# Patient Record
Sex: MALE | Race: WHITE | NOT HISPANIC OR LATINO | Employment: OTHER | URBAN - METROPOLITAN AREA
[De-identification: names, ages, dates, MRNs, and addresses within clinical notes are randomized per-mention and may not be internally consistent; named-entity substitution may affect disease eponyms.]

---

## 2023-11-21 ENCOUNTER — TELEPHONE (OUTPATIENT)
Dept: OPHTHALMOLOGY | Facility: MEDICAL CENTER | Age: 68
End: 2023-11-21

## 2023-11-21 NOTE — PROGRESS NOTES
REFERRING PHYSICIAN: Daniel Ramirez MD.     CONSULTING PHYSICIAN: Josh Bearden DO     CHIEF COMPLAINT: Chest pain    HISTORY OF PRESENT ILLNESS: The patient is a 68 y.o. male with history of hypertension, hyperlipidemia, and coronary artery disease. Today he states he has chest pain with exertion for the last month. It is relieved with rest. He denies shortness of breath, dizziness, lower extremity edema or orthopnea. He remains active but has slowed down. He has had vein stripping in the past for varicose veins.     PAST MEDICAL HISTORY:   Active Ambulatory Problems     Diagnosis Date Noted    Primary osteoarthritis of first carpometacarpal joint of right hand 12/13/2016    High cholesterol      Resolved Ambulatory Problems     Diagnosis Date Noted    No Resolved Ambulatory Problems     Past Medical History:   Diagnosis Date    CAD (coronary artery disease)     Fracture     Fracture     Hyperlipidemia     Hypertension        PAST SURGICAL HISTORY:   Past Surgical History:   Procedure Laterality Date    ARTHROPLASTY, [facility] JOINT, USING FLEXOR CARPI RADIALIS TENDON Right 12/13/2016    Procedure: Right thumb carpometacarpal arthroscopy abductor pollicus longus graft;  Surgeon: Florencio Ferrer M.D.;  Location: SURGERY [facility];  Service:     APPENDECTOMY      HAND SURGERY Left     Skin grafts and tendon damage    SHOULDER SURGERY Right     Shoulder Rotator Cuff    VEIN LIGATION STRIPPING BILATERAL          ALLERGIES: No Known Allergies     CURRENT MEDICATIONS:   Current Outpatient Medications:     nitroglycerin (NITROSTAT) 0.4 MG SL Tab, Place 0.4 mg under the tongue., Disp: , Rfl:     atorvastatin (LIPITOR) 20 MG Tab, Take 1 Tablet by mouth every evening., Disp: 60 Tablet, Rfl: 5    ibuprofen (MOTRIN) 800 MG Tab, Take 1 Tab by mouth every 8 hours as needed., Disp: 30 Tab, Rfl: 3    isosorbide mononitrate SR (IMDUR) 30 MG TABLET SR 24 HR, Take 30 mg by mouth every day., Disp: , Rfl:     FAMILY  "HISTORY:   Family History   Problem Relation Age of Onset    Osteoporosis Mother     Dementia Father     Stroke Father         SOCIAL HISTORY:   Social History     Socioeconomic History    Marital status: Single     Spouse name: Not on file    Number of children: Not on file    Years of education: Not on file    Highest education level: Not on file   Occupational History    Not on file   Tobacco Use    Smoking status: Never    Smokeless tobacco: Never   Vaping Use    Vaping Use: Never used   Substance and Sexual Activity    Alcohol use: No    Drug use: Never    Sexual activity: Not Currently   Other Topics Concern    Back Care No    Bike Helmet Yes    Blood Transfusions No    Caffeine Concern No    Exercise Yes    Hobby Hazards No     Service No    Occupational Exposure No    Seat Belt Yes    Self-Exams No    Sleep Concern No    Special Diet No    Stress Concern No    Weight Concern Yes   Social History Narrative    Not on file     Social Determinants of Health     Financial Resource Strain: Not on file   Food Insecurity: Not on file   Transportation Needs: Not on file   Physical Activity: Not on file   Stress: Not on file   Social Connections: Not on file   Intimate Partner Violence: Not on file   Housing Stability: Not on file       REVIEW OF SYSTEMS:  Review of Systems   Constitutional: Negative.    HENT: Negative.     Eyes: Negative.    Respiratory: Negative.     Cardiovascular:  Positive for chest pain.   Gastrointestinal: Negative.    Genitourinary: Negative.    Musculoskeletal: Negative.    Skin: Negative.    Neurological: Negative.    Endo/Heme/Allergies: Negative.    Psychiatric/Behavioral: Negative.       PHYSICAL EXAMINATION:    /64 (BP Location: Left arm, Patient Position: Sitting, BP Cuff Size: Adult)   Pulse 64   Temp 36.5 °C (97.7 °F) (Temporal)   Ht 1.791 m (5' 10.5\")   Wt 88.9 kg (196 lb)   SpO2 95%   BMI 27.73 kg/m²      Physical Exam  Constitutional:       General: He is not in " "acute distress.  HENT:      Head: Normocephalic and atraumatic.      Nose: Nose normal.   Eyes:      Conjunctiva/sclera: Conjunctivae normal.      Pupils: Pupils are equal, round, and reactive to light.   Neck:      Vascular: No JVD.      Trachea: No tracheal deviation.   Cardiovascular:      Rate and Rhythm: Normal rate and regular rhythm.      Heart sounds: Normal heart sounds.   Pulmonary:      Effort: Pulmonary effort is normal. No respiratory distress.      Breath sounds: Normal breath sounds. No stridor.   Abdominal:      General: Bowel sounds are normal. There is no distension.      Palpations: Abdomen is soft.      Tenderness: There is no abdominal tenderness.   Musculoskeletal:         General: No tenderness. Normal range of motion.      Cervical back: Normal range of motion and neck supple.   Skin:     General: Skin is warm and dry.   Neurological:      Mental Status: He is alert and oriented to person, place, and time.      Coordination: Coordination normal.      Gait: Gait is intact.   Psychiatric:         Mood and Affect: Mood and affect normal.         Cognition and Memory: Memory normal.       LABS REVIEWED:  Lab Results   Component Value Date/Time    SODIUM 140 11/20/2023 10:04 AM    SODIUM 140 09/12/2023 02:54 PM    POTASSIUM 4.1 11/20/2023 10:04 AM    POTASSIUM 4.3 09/12/2023 02:54 PM    CHLORIDE 108 11/20/2023 10:04 AM    CHLORIDE 107 09/12/2023 02:54 PM    CO2 26.0 11/20/2023 10:04 AM    CO2 26 09/12/2023 02:54 PM    GLUCOSE 93 11/20/2023 10:04 AM    GLUCOSE 97 09/12/2023 02:54 PM    BUN 16.0 11/20/2023 10:04 AM    BUN 22 (H) 09/12/2023 02:54 PM    CREATININE 0.90 11/20/2023 10:04 AM    CREATININE 0.8 09/12/2023 02:54 PM    BUNCREATRAT 17.8 11/20/2023 10:04 AM      No results found for: \"PROTHROMBTM\", \"INR\"   Lab Results   Component Value Date/Time    WBC 5.40 11/20/2023 10:04 AM    WBC 4.2 (L) 09/12/2023 02:54 PM    RBC 4.61 11/20/2023 10:04 AM    RBC 4.63 (L) 09/12/2023 02:54 PM    HEMOGLOBIN " 15.0 11/20/2023 10:04 AM    HEMOGLOBIN 15.0 09/12/2023 02:54 PM    HEMATOCRIT 44.4 11/20/2023 10:04 AM    HEMATOCRIT 44.6 09/12/2023 02:54 PM    MCV 96.4 (H) 11/20/2023 10:04 AM    MCV 96.3 (H) 09/12/2023 02:54 PM    MCH 32.6 11/20/2023 10:04 AM    MCH 32.4 09/12/2023 02:54 PM    MCHC 33.8 11/20/2023 10:04 AM    MCHC 33.6 09/12/2023 02:54 PM    MPV 8.1 11/20/2023 10:04 AM    MPV 9.5 09/12/2023 02:54 PM    NEUTSPOLYS 60.8 11/20/2023 10:04 AM    LYMPHOCYTES 18.6 11/20/2023 10:04 AM    MONOCYTES 10.1 11/20/2023 10:04 AM    EOSINOPHILS 8.6 11/20/2023 10:04 AM    BASOPHILS 1.9 11/20/2023 10:04 AM        IMAGING REVIEWED AND INTERPRETED:    ECHOCARDIOGRAM 11/20/23 [facility]:  1. The left ventricle is normal in size and systolic function with an ejection fraction of 55-60% by Santa's biplane with no wall motion abnormalities noted. There is grade 1 (impaired relaxation) diastolic function with normal LV filling pressures.  2. The right atrium size is mildly dilated  3. Mild aortic regurgitation  4. Mild mitral regurgitation  5. The ascending aorta measures 4.0 cm.  6. The right ventricle is normal in size and function; RVSP is normal at 33 mmHg with a RAP of 8 mmHg.    CARDIAC [place]HE[place] [facility] [place]  [place] VENTRICLE: LVEDP was 16 mmHg, no significant gradient across the aortic valve on pullback.  LEFT MAIN: Large-caliber vessel bifurcating into an LAD and left circumflex without angiographically significant atherosclerotic disease  LAD: Large-caliber, heavily calcified vessel, the LAD has severe disease and  in the proximal segment after the take off of a diagonal. The LAD and the second diagonal fills via a collateral from the proximal RCA. The first diagonal is a medium sized vessel with 60-70% stenosis proximally.  LCX: Large-caliber vessel giving off 2 large OM branches and terminates in the AV groove. The Ostial and proximal LCX has 2 tandem 85-90% stenosis. The lesion is just before take off of an OM1. The OM2  has mild to moderate disease in the proximal segment.  RCA: The RCA is  in the proximal segment but supplies collateral to the LAD. The distal RCA fills via collaterals from the AV groove LCX.    Final impressions:  - Severe calcified 3 vessel disease involving  of the proximal LAD,  of the proximal RCA and Severe disease of the LCX  - Collaterals to the LAD from the right system and Collaterals to the RCA from the AV groove LCX       IMPRESSION:  Coronary artery disease, stable angina, hypertension, hyperlipidemia      PLAN:  I recommend CABG x 3-4, with EVH ,possible radial or BIMA, possible ascending aortic aneurysm repair. Imaging is ordered to facilitate decision making for conduit choice and the aneurysm.     The procedure, its risks, benefits, potential complications and alternative treatments were discussed with the patient in detail including the risks should he decide not to undergo my recommended treatment. All of his questions were answered to his satisfaction and he is willing to proceed with the operation. The risks include death, stroke, infection: to include a rare bacterial infection related to the use of the heart/lung machine, jasmina-operative myocardial infarction, dysrhythmias, diaphragmatic paralysis, chest wall paresthesia, tracheostomy, kidney or other organ failure, possible return to the operating room for bleeding, bleeding requiring transfusion with its attendant risks including AIDS or hepatitis, dehiscence of surgical incisions, respiratory complications including the need for prolonged ventilator support, Protamine or other drug reaction, peripheral neuropathy, loss of limb, and miscount of surgical items. The operative mortality risk is approximately 1%. The STS mortality risk score is 1% and the morbidity and mortality risk score is 9% for CABG. The scores were discussed with patient. He will require CT Chest to rule out ascending aortic aneurysm prior to surgery.     Thank  you for this very challenging consultation and participation in the patient’s care.  I will keep you apprised of all future developments.      The operation is scheduled for Ded. 18th at 0730 AM at  [facility].         Sincerely,       Josh Bearden DO.

## 2023-11-30 ENCOUNTER — OFFICE VISIT (OUTPATIENT)
Dept: CARDIOTHORACIC SURGERY | Facility: MEDICAL CENTER | Age: 68
End: 2023-11-30
Payer: MEDICARE

## 2023-11-30 VITALS
HEIGHT: 71 IN | HEART RATE: 64 BPM | TEMPERATURE: 97.7 F | WEIGHT: 196 LBS | DIASTOLIC BLOOD PRESSURE: 64 MMHG | OXYGEN SATURATION: 95 % | SYSTOLIC BLOOD PRESSURE: 126 MMHG | BODY MASS INDEX: 27.44 KG/M2

## 2023-11-30 DIAGNOSIS — I25.119 CORONARY ARTERY DISEASE INVOLVING NATIVE CORONARY ARTERY OF NATIVE HEART WITH ANGINA PECTORIS (HCC): ICD-10-CM

## 2023-11-30 DIAGNOSIS — I77.810 ASCENDING AORTA DILATATION (HCC): ICD-10-CM

## 2023-11-30 PROCEDURE — 99205 OFFICE O/P NEW HI 60 MIN: CPT | Performed by: THORACIC SURGERY (CARDIOTHORACIC VASCULAR SURGERY)

## 2023-11-30 PROCEDURE — 3074F SYST BP LT 130 MM HG: CPT | Performed by: THORACIC SURGERY (CARDIOTHORACIC VASCULAR SURGERY)

## 2023-11-30 PROCEDURE — 3078F DIAST BP <80 MM HG: CPT | Performed by: THORACIC SURGERY (CARDIOTHORACIC VASCULAR SURGERY)

## 2023-11-30 ASSESSMENT — FIBROSIS 4 INDEX: FIB4 SCORE: 1.3

## 2023-11-30 ASSESSMENT — ENCOUNTER SYMPTOMS
PSYCHIATRIC NEGATIVE: 1
GASTROINTESTINAL NEGATIVE: 1
EYES NEGATIVE: 1
RESPIRATORY NEGATIVE: 1
MUSCULOSKELETAL NEGATIVE: 1
NEUROLOGICAL NEGATIVE: 1
CONSTITUTIONAL NEGATIVE: 1

## 2023-12-01 ENCOUNTER — HOSPITAL ENCOUNTER (OUTPATIENT)
Dept: RADIOLOGY | Facility: MEDICAL CENTER | Age: 68
End: 2023-12-01
Attending: THORACIC SURGERY (CARDIOTHORACIC VASCULAR SURGERY)
Payer: MEDICARE

## 2023-12-01 DIAGNOSIS — I25.119 CORONARY ARTERY DISEASE INVOLVING NATIVE CORONARY ARTERY OF NATIVE HEART WITH ANGINA PECTORIS (HCC): ICD-10-CM

## 2023-12-01 PROCEDURE — 93880 EXTRACRANIAL BILAT STUDY: CPT | Mod: 26 | Performed by: INTERNAL MEDICINE

## 2023-12-01 PROCEDURE — 93970 EXTREMITY STUDY: CPT

## 2023-12-01 PROCEDURE — 93970 EXTREMITY STUDY: CPT | Mod: 26 | Performed by: INTERNAL MEDICINE

## 2023-12-01 PROCEDURE — 93931 UPPER EXTREMITY STUDY: CPT | Mod: LT

## 2023-12-01 PROCEDURE — 93880 EXTRACRANIAL BILAT STUDY: CPT

## 2023-12-01 PROCEDURE — 93931 UPPER EXTREMITY STUDY: CPT | Mod: 26,LT | Performed by: INTERNAL MEDICINE

## 2023-12-01 NOTE — PROGRESS NOTES
Problem: Prehabilitation    Goal: optimize identified modifiable risk factors prior to cardiac surgery.     Intervention: Screening and interventions for the following  risk factors with educational materials provided if indicated  and patient demonstrates readiness to participate.  Dentition, malnutrition, CAD and dietary cholesterol,  obesity, alcohol and tobacco abuse, illegal drug use, and   social support system for post discharge planning.    Additionally, home exercise regimen initiation or continuation  (if appropriate), and teaching of and participation of  inspiratory muscle training via incentive spirometer (provided).    Review of post-surgical physical limitations, upcoming  appointments & testing, ordered diagnostics, and medication review  to identify and educate on anticoagulant and antihypertensives  that need cessation in the days prior to surgery.    The cardiac surgery prehabilitation sheet, surgery instruction sheet,  MAP, education booklet, vitals logbook, normals after heart surgery,  and cardiac rehab flier was provided for patient to read and review.    Surgical CHG wipes were also provided with instructions on use.    DENTITION:  Routine dental appointment prior to surgery is  not indicated for CABG possible TAA repair procedure.    he was not encouraged to get a dental   cleaning as he  does get regular  dental cleaning and denies current dental   infection or issues that should be  addressed prior to surgery.    INCENTIVE SPIROMETRY:  Discussed importance of incentive spirometry (IS) use,  20 times a day AT MINIMUM but more often if possible to  optimize cardio-pulmonary function prior to surgery.  They were instructed to allow a 5 minute rest in  between uses to achieve max IS volume.  Education with return demonstration performed.   Patient is effective, coaching was not needed.    Prehabilitation IS baseline is 3500.    HOME EXERCISE REGIMEN:  We discussed importance of preventing  deconditioning and  muscle wasting in the time preceding surgery as this will occur  post surgery.    > 50 % left main disease present? NO  EF-- 60%  Active sub lingual nitro use? NOT USING  he is appropriate for initiation  of a home exercise regimen.    he is moderately physically active; current  exercise tolerance/level is moderate due to some  symptoms of arm pain and schest pressure with moderate activity.    he was educated to start an   exercise regimen of slow walking on   a flat surface 30 minutes a day, adjusting the  length for tolerance level.     he was educated  that if chest pain or SOB occurs patient is to stop  immediately and if symptoms do not  resolve after stopping to call 911.    he was also encouraged to practice sit to stand  from a chair with one arm to assist or no arm assistance  12 times a day to strengthen quadriceps and improve balance.    CAD:  Patient does have known CAD; education on  cholesterol, diet, and the heart are indicated.    OBESITY:  Patient's BMI is not over 30.  Education regarding portion sizing and diet are not indicated and were not provided.    MALNUTRITION:  Malnutrition screening tool (MST) shows he is not at risk  with a score of 0. (0-1 not at risk; greater or equal to 2 is at risk).    Given MST score, functional capacity,   and no reported muscle loss, it was not  recommended they increase their protein  intake to 1.2 to 2.5 gram/kg/day    SMOKING:  Patient denies current smoking history.  Smoking risks  and cessation education not indicated and was not provided.    ALCOHOL ABUSE:  Patient denies alcohol abuse.  Alcohol risks and cessation  education not indicated and was not provided.    ILLEGAL DRUG USE:  Patient denies illicit drug use.  Illicit drug use risks  and cessation education not indicated and was not provided    SOCIAL SUPPORT SYSTEM FOR DISCHARGE NEEDS:    Patient does have family,  his brother and sister Bri to stay for 1-week post discharge  to assist with ADL's. No barriers to hospital discharge anticipated.    PSYCHOLOGICAL PREPARATION:  We discussed the basics of physical limitation post op to include:               No driving for 4 weeks               No lifting, pushing or pulling > 10 lbs for 6 weeks  Sternal precautions to include moving within the tube and  safe mobility in and out of bed and the chair.    ANTIBIOTIC STEWARDSHIP:  MRSA swab will be collected by [facility] during pre-admit testing.    MEDICATION AN UPCOMING APPOINTMENTS REVIEW:  Current medications were reviewed that may need  specific stop dates prior to surgery. The patient was instructed   to stop the following medications after the indicated date.    No meds to stop    Vascular scheduling sheet was provided and explained.    Walk test Times: 4 4 4    A phone appointment for pre op education was made  for 12/14 at lunch to review all provided education materials.

## 2023-12-04 ENCOUNTER — TELEPHONE (OUTPATIENT)
Dept: CARDIOTHORACIC SURGERY | Facility: MEDICAL CENTER | Age: 68
End: 2023-12-04

## 2023-12-04 ENCOUNTER — APPOINTMENT (OUTPATIENT)
Dept: ADMISSIONS | Facility: MEDICAL CENTER | Age: 68
DRG: 236 | End: 2023-12-04
Attending: THORACIC SURGERY (CARDIOTHORACIC VASCULAR SURGERY)
Payer: MEDICARE

## 2023-12-04 ENCOUNTER — HOSPITAL ENCOUNTER (OUTPATIENT)
Dept: RADIOLOGY | Facility: MEDICAL CENTER | Age: 68
End: 2023-12-04
Attending: THORACIC SURGERY (CARDIOTHORACIC VASCULAR SURGERY)
Payer: MEDICARE

## 2023-12-04 DIAGNOSIS — I25.119 CORONARY ARTERY DISEASE INVOLVING NATIVE CORONARY ARTERY OF NATIVE HEART WITH ANGINA PECTORIS (HCC): ICD-10-CM

## 2023-12-04 PROCEDURE — 71275 CT ANGIOGRAPHY CHEST: CPT

## 2023-12-04 PROCEDURE — 700117 HCHG RX CONTRAST REV CODE 255: Performed by: THORACIC SURGERY (CARDIOTHORACIC VASCULAR SURGERY)

## 2023-12-04 RX ADMIN — IOHEXOL 100 ML: 350 INJECTION, SOLUTION INTRAVENOUS at 15:19

## 2023-12-04 NOTE — TELEPHONE ENCOUNTER
The number to the [facility] was provided, he was also told the surgical plan is CABG X 3-4 vessels and that the labs have to be at [facility] on Friday as surgery is Monday and they are not open on the weekends.    Call time 3 minutes

## 2023-12-12 ENCOUNTER — PRE-ADMISSION TESTING (OUTPATIENT)
Dept: ADMISSIONS | Facility: MEDICAL CENTER | Age: 68
DRG: 236 | End: 2023-12-12
Attending: THORACIC SURGERY (CARDIOTHORACIC VASCULAR SURGERY)
Payer: MEDICARE

## 2023-12-12 RX ORDER — ASPIRIN 81 MG/1
81 TABLET ORAL DAILY
COMMUNITY

## 2023-12-14 ENCOUNTER — TELEPHONE (OUTPATIENT)
Dept: CARDIOTHORACIC SURGERY | Facility: MEDICAL CENTER | Age: 68
End: 2023-12-14
Payer: MEDICARE

## 2023-12-14 NOTE — TELEPHONE ENCOUNTER
Patient wishes me to call him for review in a few hours as he is out at this time.  Will call back later today.

## 2023-12-15 ENCOUNTER — PRE-ADMISSION TESTING (OUTPATIENT)
Dept: ADMISSIONS | Facility: MEDICAL CENTER | Age: 68
DRG: 236 | End: 2023-12-15
Attending: THORACIC SURGERY (CARDIOTHORACIC VASCULAR SURGERY)
Payer: MEDICARE

## 2023-12-15 ENCOUNTER — HOSPITAL ENCOUNTER (OUTPATIENT)
Dept: RADIOLOGY | Facility: MEDICAL CENTER | Age: 68
DRG: 236 | End: 2023-12-15
Attending: THORACIC SURGERY (CARDIOTHORACIC VASCULAR SURGERY) | Admitting: THORACIC SURGERY (CARDIOTHORACIC VASCULAR SURGERY)
Payer: MEDICARE

## 2023-12-15 ENCOUNTER — TELEPHONE (OUTPATIENT)
Dept: CARDIOTHORACIC SURGERY | Facility: MEDICAL CENTER | Age: 68
End: 2023-12-15
Payer: MEDICARE

## 2023-12-15 DIAGNOSIS — Z01.811 PRE-OPERATIVE RESPIRATORY EXAMINATION: ICD-10-CM

## 2023-12-15 DIAGNOSIS — Z01.810 PRE-OPERATIVE CARDIOVASCULAR EXAMINATION: ICD-10-CM

## 2023-12-15 DIAGNOSIS — Z01.812 PRE-OPERATIVE LABORATORY EXAMINATION: ICD-10-CM

## 2023-12-15 LAB
ABO GROUP BLD: NORMAL
ALBUMIN SERPL BCP-MCNC: 4.3 G/DL (ref 3.2–4.9)
ALBUMIN/GLOB SERPL: 1.3 G/DL
ALP SERPL-CCNC: 89 U/L (ref 30–99)
ALT SERPL-CCNC: 23 U/L (ref 2–50)
AMPHET UR QL SCN: NEGATIVE
ANION GAP SERPL CALC-SCNC: 9 MMOL/L (ref 7–16)
APPEARANCE UR: CLEAR
APTT PPP: 31.1 SEC (ref 24.7–36)
AST SERPL-CCNC: 14 U/L (ref 12–45)
BARBITURATES UR QL SCN: NEGATIVE
BASOPHILS # BLD AUTO: 1.9 % (ref 0–1.8)
BASOPHILS # BLD: 0.1 K/UL (ref 0–0.12)
BENZODIAZ UR QL SCN: NEGATIVE
BILIRUB SERPL-MCNC: 0.9 MG/DL (ref 0.1–1.5)
BILIRUB UR QL STRIP.AUTO: NEGATIVE
BLD GP AB SCN SERPL QL: NORMAL
BUN SERPL-MCNC: 14 MG/DL (ref 8–22)
BZE UR QL SCN: NEGATIVE
CALCIUM ALBUM COR SERPL-MCNC: 8.9 MG/DL (ref 8.5–10.5)
CALCIUM SERPL-MCNC: 9.1 MG/DL (ref 8.5–10.5)
CANNABINOIDS UR QL SCN: NEGATIVE
CHLORIDE SERPL-SCNC: 104 MMOL/L (ref 96–112)
CO2 SERPL-SCNC: 27 MMOL/L (ref 20–33)
COLOR UR: YELLOW
CREAT SERPL-MCNC: 0.71 MG/DL (ref 0.5–1.4)
EKG IMPRESSION: NORMAL
EOSINOPHIL # BLD AUTO: 0.38 K/UL (ref 0–0.51)
EOSINOPHIL NFR BLD: 7.4 % (ref 0–6.9)
ERYTHROCYTE [DISTWIDTH] IN BLOOD BY AUTOMATED COUNT: 42.8 FL (ref 35.9–50)
EST. AVERAGE GLUCOSE BLD GHB EST-MCNC: 117 MG/DL
FENTANYL UR QL: NEGATIVE
GFR SERPLBLD CREATININE-BSD FMLA CKD-EPI: 99 ML/MIN/1.73 M 2
GLOBULIN SER CALC-MCNC: 3.2 G/DL (ref 1.9–3.5)
GLUCOSE SERPL-MCNC: 87 MG/DL (ref 65–99)
GLUCOSE UR STRIP.AUTO-MCNC: NEGATIVE MG/DL
HBA1C MFR BLD: 5.7 % (ref 4–5.6)
HCT VFR BLD AUTO: 44.8 % (ref 42–52)
HGB BLD-MCNC: 14.9 G/DL (ref 14–18)
IMM GRANULOCYTES # BLD AUTO: 0.01 K/UL (ref 0–0.11)
IMM GRANULOCYTES NFR BLD AUTO: 0.2 % (ref 0–0.9)
INR PPP: 1.2 (ref 0.87–1.13)
KETONES UR STRIP.AUTO-MCNC: NEGATIVE MG/DL
LEUKOCYTE ESTERASE UR QL STRIP.AUTO: NEGATIVE
LYMPHOCYTES # BLD AUTO: 1.04 K/UL (ref 1–4.8)
LYMPHOCYTES NFR BLD: 20.3 % (ref 22–41)
MCH RBC QN AUTO: 32 PG (ref 27–33)
MCHC RBC AUTO-ENTMCNC: 33.3 G/DL (ref 32.3–36.5)
MCV RBC AUTO: 96.1 FL (ref 81.4–97.8)
METHADONE UR QL SCN: NEGATIVE
MICRO URNS: NORMAL
MONOCYTES # BLD AUTO: 0.51 K/UL (ref 0–0.85)
MONOCYTES NFR BLD AUTO: 9.9 % (ref 0–13.4)
NEUTROPHILS # BLD AUTO: 3.09 K/UL (ref 1.82–7.42)
NEUTROPHILS NFR BLD: 60.3 % (ref 44–72)
NITRITE UR QL STRIP.AUTO: NEGATIVE
NRBC # BLD AUTO: 0 K/UL
NRBC BLD-RTO: 0 /100 WBC (ref 0–0.2)
OPIATES UR QL SCN: NEGATIVE
OXYCODONE UR QL SCN: NEGATIVE
PCP UR QL SCN: NEGATIVE
PH UR STRIP.AUTO: 6.5 [PH] (ref 5–8)
PLATELET # BLD AUTO: 310 K/UL (ref 164–446)
PMV BLD AUTO: 10.4 FL (ref 9–12.9)
POTASSIUM SERPL-SCNC: 4 MMOL/L (ref 3.6–5.5)
PROPOXYPH UR QL SCN: NEGATIVE
PROT SERPL-MCNC: 7.5 G/DL (ref 6–8.2)
PROT UR QL STRIP: NEGATIVE MG/DL
PROTHROMBIN TIME: 15.4 SEC (ref 12–14.6)
RBC # BLD AUTO: 4.66 M/UL (ref 4.7–6.1)
RBC UR QL AUTO: NEGATIVE
RH BLD: NORMAL
SCCMEC + MECA PNL NOSE NAA+PROBE: NEGATIVE
SCCMEC + MECA PNL NOSE NAA+PROBE: NEGATIVE
SODIUM SERPL-SCNC: 140 MMOL/L (ref 135–145)
SP GR UR STRIP.AUTO: 1.01
UROBILINOGEN UR STRIP.AUTO-MCNC: 0.2 MG/DL
WBC # BLD AUTO: 5.1 K/UL (ref 4.8–10.8)

## 2023-12-15 PROCEDURE — 86850 RBC ANTIBODY SCREEN: CPT

## 2023-12-15 PROCEDURE — 87641 MR-STAPH DNA AMP PROBE: CPT

## 2023-12-15 PROCEDURE — 85730 THROMBOPLASTIN TIME PARTIAL: CPT

## 2023-12-15 PROCEDURE — 86900 BLOOD TYPING SEROLOGIC ABO: CPT

## 2023-12-15 PROCEDURE — 93005 ELECTROCARDIOGRAM TRACING: CPT

## 2023-12-15 PROCEDURE — 87640 STAPH A DNA AMP PROBE: CPT

## 2023-12-15 PROCEDURE — 85025 COMPLETE CBC W/AUTO DIFF WBC: CPT

## 2023-12-15 PROCEDURE — 85610 PROTHROMBIN TIME: CPT

## 2023-12-15 PROCEDURE — 83036 HEMOGLOBIN GLYCOSYLATED A1C: CPT

## 2023-12-15 PROCEDURE — 81003 URINALYSIS AUTO W/O SCOPE: CPT

## 2023-12-15 PROCEDURE — 80053 COMPREHEN METABOLIC PANEL: CPT

## 2023-12-15 PROCEDURE — 80307 DRUG TEST PRSMV CHEM ANLYZR: CPT

## 2023-12-15 PROCEDURE — 36415 COLL VENOUS BLD VENIPUNCTURE: CPT

## 2023-12-15 PROCEDURE — 93010 ELECTROCARDIOGRAM REPORT: CPT | Performed by: INTERNAL MEDICINE

## 2023-12-15 PROCEDURE — 86901 BLOOD TYPING SEROLOGIC RH(D): CPT

## 2023-12-15 PROCEDURE — 71046 X-RAY EXAM CHEST 2 VIEWS: CPT

## 2023-12-15 RX ORDER — DEXMEDETOMIDINE HYDROCHLORIDE 4 UG/ML
0-1.5 INJECTION, SOLUTION INTRAVENOUS CONTINUOUS
Status: DISCONTINUED | OUTPATIENT
Start: 2023-12-18 | End: 2023-12-18

## 2023-12-15 RX ORDER — NOREPINEPHRINE BITARTRATE 0.03 MG/ML
0-1 INJECTION, SOLUTION INTRAVENOUS CONTINUOUS
Status: DISCONTINUED | OUTPATIENT
Start: 2023-12-18 | End: 2023-12-18

## 2023-12-15 RX ORDER — EPINEPHRINE HCL IN 0.9 % NACL 4MG/250ML
0-.5 PLASTIC BAG, INJECTION (ML) INTRAVENOUS CONTINUOUS
Status: DISCONTINUED | OUTPATIENT
Start: 2023-12-18 | End: 2023-12-18

## 2023-12-15 ASSESSMENT — FIBROSIS 4 INDEX: FIB4 SCORE: 0.65

## 2023-12-15 NOTE — TELEPHONE ENCOUNTER
Patient was reminded that CABG X 3-4 possible TAA repair surgery with Dr. Bearden is on 12/18 at 0730 in the morning. he   are aware check in is at 0515 am.    Baseline IS was 3500. he has been compliant   with the IS. Volume has improved to 4000.    he was prescribed a walking regimen or  told to continue he current regimen and   he has been compliant.   he has been practicing sit to stand.    The CHG wipes instructions were reviewed and understood.    PAT date and time was reviewed with patient and  verified it is within 72 hours of surgery.    Vascular studies and procedures:  were scheduled, completed,  and reviewed by myself for concerning  results did need escalation to the EMILY/MD.    he did not need a dental check/work.    he had no meds to stop.    he was told that no medication(s) are okay to  take the morning of surgery prior to check in.     he was reminded no food after midnight.    Call time 7 minutes

## 2023-12-18 ENCOUNTER — ANESTHESIA EVENT (OUTPATIENT)
Dept: SURGERY | Facility: MEDICAL CENTER | Age: 68
DRG: 236 | End: 2023-12-18
Payer: MEDICARE

## 2023-12-18 ENCOUNTER — APPOINTMENT (OUTPATIENT)
Dept: RADIOLOGY | Facility: MEDICAL CENTER | Age: 68
DRG: 236 | End: 2023-12-18
Attending: THORACIC SURGERY (CARDIOTHORACIC VASCULAR SURGERY)
Payer: MEDICARE

## 2023-12-18 ENCOUNTER — APPOINTMENT (OUTPATIENT)
Dept: CARDIOLOGY | Facility: MEDICAL CENTER | Age: 68
DRG: 236 | End: 2023-12-18
Attending: THORACIC SURGERY (CARDIOTHORACIC VASCULAR SURGERY)
Payer: MEDICARE

## 2023-12-18 ENCOUNTER — ANESTHESIA (OUTPATIENT)
Dept: SURGERY | Facility: MEDICAL CENTER | Age: 68
DRG: 236 | End: 2023-12-18
Payer: MEDICARE

## 2023-12-18 ENCOUNTER — HOSPITAL ENCOUNTER (INPATIENT)
Facility: MEDICAL CENTER | Age: 68
LOS: 5 days | DRG: 236 | End: 2023-12-23
Attending: THORACIC SURGERY (CARDIOTHORACIC VASCULAR SURGERY) | Admitting: THORACIC SURGERY (CARDIOTHORACIC VASCULAR SURGERY)
Payer: MEDICARE

## 2023-12-18 DIAGNOSIS — I25.810 CORONARY ARTERY DISEASE INVOLVING CORONARY BYPASS GRAFT OF NATIVE HEART, UNSPECIFIED WHETHER ANGINA PRESENT: ICD-10-CM

## 2023-12-18 DIAGNOSIS — I25.10 CORONARY ARTERY DISEASE DUE TO CALCIFIED CORONARY LESION: ICD-10-CM

## 2023-12-18 DIAGNOSIS — Z95.1 S/P CABG X 3: ICD-10-CM

## 2023-12-18 DIAGNOSIS — I25.84 CORONARY ARTERY DISEASE DUE TO CALCIFIED CORONARY LESION: ICD-10-CM

## 2023-12-18 PROBLEM — I10 PRIMARY HYPERTENSION: Status: ACTIVE | Noted: 2023-12-18

## 2023-12-18 PROBLEM — R73.9 HYPERGLYCEMIA: Status: ACTIVE | Noted: 2023-12-18

## 2023-12-18 PROBLEM — Z99.11 ENCOUNTER FOR WEANING FROM VENTILATOR (HCC): Status: ACTIVE | Noted: 2023-12-18

## 2023-12-18 LAB
ABO + RH BLD: NORMAL
ACT BLD: 131 SEC (ref 74–137)
ACT BLD: 152 SEC (ref 74–137)
ACT BLD: 471 SEC (ref 74–137)
ACT BLD: 579 SEC (ref 74–137)
ACT BLD: 612 SEC (ref 74–137)
ACT BLD: 655 SEC (ref 74–137)
APTT PPP: 30.9 SEC (ref 24.7–36)
ARTERIAL PATENCY WRIST A: ABNORMAL
BASE EXCESS BLDA CALC-SCNC: -2 MMOL/L (ref -4–3)
BASE EXCESS BLDA CALC-SCNC: -3 MMOL/L (ref -4–3)
BASE EXCESS BLDA CALC-SCNC: -3 MMOL/L (ref -4–3)
BASE EXCESS BLDA CALC-SCNC: -4 MMOL/L (ref -4–3)
BASE EXCESS BLDA CALC-SCNC: -5 MMOL/L (ref -4–3)
BASE EXCESS BLDA CALC-SCNC: -5 MMOL/L (ref -4–3)
BASE EXCESS BLDA CALC-SCNC: -6 MMOL/L (ref -4–3)
BODY TEMPERATURE: ABNORMAL DEGREES
CA-I BLD ISE-SCNC: 0.96 MMOL/L (ref 1.1–1.3)
CA-I BLD ISE-SCNC: 1 MMOL/L (ref 1.1–1.3)
CA-I BLD ISE-SCNC: 1.01 MMOL/L (ref 1.1–1.3)
CA-I BLD ISE-SCNC: 1.12 MMOL/L (ref 1.1–1.3)
CA-I BLD ISE-SCNC: 1.19 MMOL/L (ref 1.1–1.3)
CA-I BLD ISE-SCNC: 1.22 MMOL/L (ref 1.1–1.3)
CA-I BLD ISE-SCNC: 1.27 MMOL/L (ref 1.1–1.3)
CO2 BLDA-SCNC: 21 MMOL/L (ref 20–33)
CO2 BLDA-SCNC: 22 MMOL/L (ref 20–33)
CO2 BLDA-SCNC: 23 MMOL/L (ref 20–33)
CO2 BLDA-SCNC: 24 MMOL/L (ref 20–33)
CO2 BLDA-SCNC: 25 MMOL/L (ref 20–33)
DELSYS IDSYS: ABNORMAL
EKG IMPRESSION: NORMAL
END TIDAL CARBON DIOXIDE IECO2: 32 MMHG
END TIDAL CARBON DIOXIDE IECO2: 37 MMHG
END TIDAL CARBON DIOXIDE IECO2: 42 MMHG
GLUCOSE BLD STRIP.AUTO-MCNC: 133 MG/DL (ref 65–99)
GLUCOSE BLD STRIP.AUTO-MCNC: 150 MG/DL (ref 65–99)
GLUCOSE BLD STRIP.AUTO-MCNC: 183 MG/DL (ref 65–99)
GLUCOSE BLD STRIP.AUTO-MCNC: 88 MG/DL (ref 65–99)
GLUCOSE BLD STRIP.AUTO-MCNC: 90 MG/DL (ref 65–99)
GLUCOSE BLD STRIP.AUTO-MCNC: 93 MG/DL (ref 65–99)
HCO3 BLDA-SCNC: 20.2 MMOL/L (ref 17–25)
HCO3 BLDA-SCNC: 20.4 MMOL/L (ref 17–25)
HCO3 BLDA-SCNC: 20.7 MMOL/L (ref 17–25)
HCO3 BLDA-SCNC: 21 MMOL/L (ref 17–25)
HCO3 BLDA-SCNC: 21.7 MMOL/L (ref 17–25)
HCO3 BLDA-SCNC: 22 MMOL/L (ref 17–25)
HCO3 BLDA-SCNC: 22.2 MMOL/L (ref 17–25)
HCO3 BLDA-SCNC: 22.8 MMOL/L (ref 17–25)
HCO3 BLDA-SCNC: 23.1 MMOL/L (ref 17–25)
HCT VFR BLD AUTO: 42.3 % (ref 42–52)
HCT VFR BLD CALC: 32 % (ref 42–52)
HCT VFR BLD CALC: 33 % (ref 42–52)
HCT VFR BLD CALC: 33 % (ref 42–52)
HCT VFR BLD CALC: 37 % (ref 42–52)
HCT VFR BLD CALC: 38 % (ref 42–52)
HCT VFR BLD CALC: 41 % (ref 42–52)
HGB BLD-MCNC: 10.9 G/DL (ref 14–18)
HGB BLD-MCNC: 11.2 G/DL (ref 14–18)
HGB BLD-MCNC: 11.2 G/DL (ref 14–18)
HGB BLD-MCNC: 12.6 G/DL (ref 14–18)
HGB BLD-MCNC: 12.9 G/DL (ref 14–18)
HGB BLD-MCNC: 13.9 G/DL (ref 14–18)
HGB BLD-MCNC: 14.1 G/DL (ref 14–18)
HOROWITZ INDEX BLDA+IHG-RTO: 206 MM[HG]
HOROWITZ INDEX BLDA+IHG-RTO: 239 MM[HG]
HOROWITZ INDEX BLDA+IHG-RTO: 246 MM[HG]
INR PPP: 1.33 (ref 0.87–1.13)
LACTATE BLD-SCNC: 1.2 MMOL/L (ref 0.5–2)
LACTATE BLD-SCNC: 1.7 MMOL/L (ref 0.5–2)
MAGNESIUM SERPL-MCNC: 2.9 MG/DL (ref 1.5–2.5)
MODE IMODE: ABNORMAL
O2/TOTAL GAS SETTING VFR VENT: 100 %
O2/TOTAL GAS SETTING VFR VENT: 50 %
O2/TOTAL GAS SETTING VFR VENT: 70 %
PATHOLOGY CONSULT NOTE: NORMAL
PCO2 BLDA: 32 MMHG (ref 26–37)
PCO2 BLDA: 32.2 MMHG (ref 26–37)
PCO2 BLDA: 34.2 MMHG (ref 26–37)
PCO2 BLDA: 36.8 MMHG (ref 26–37)
PCO2 BLDA: 37.1 MMHG (ref 26–37)
PCO2 BLDA: 40.6 MMHG (ref 26–37)
PCO2 BLDA: 42 MMHG (ref 26–37)
PCO2 BLDA: 44.5 MMHG (ref 26–37)
PCO2 BLDA: 47.3 MMHG (ref 26–37)
PCO2 TEMP ADJ BLDA: 29 MMHG (ref 26–37)
PCO2 TEMP ADJ BLDA: 31.2 MMHG (ref 26–37)
PCO2 TEMP ADJ BLDA: 33.2 MMHG (ref 26–37)
PCO2 TEMP ADJ BLDA: 33.3 MMHG (ref 26–37)
PCO2 TEMP ADJ BLDA: 36.3 MMHG (ref 26–37)
PCO2 TEMP ADJ BLDA: 39 MMHG (ref 26–37)
PCO2 TEMP ADJ BLDA: 39.9 MMHG (ref 26–37)
PCO2 TEMP ADJ BLDA: 43.2 MMHG (ref 26–37)
PCO2 TEMP ADJ BLDA: 46.1 MMHG (ref 26–37)
PEEP END EXPIRATORY PRESSURE IPEEP: 8 CMH20
PERCENT MINUTE VOLUME IPMV: 130
PERCENT MINUTE VOLUME IPMV: 160
PERCENT MINUTE VOLUME IPMV: 160
PH BLDA: 7.29 [PH] (ref 7.4–7.5)
PH BLDA: 7.3 [PH] (ref 7.4–7.5)
PH BLDA: 7.32 [PH] (ref 7.4–7.5)
PH BLDA: 7.32 [PH] (ref 7.4–7.5)
PH BLDA: 7.35 [PH] (ref 7.4–7.5)
PH BLDA: 7.39 [PH] (ref 7.4–7.5)
PH BLDA: 7.42 [PH] (ref 7.4–7.5)
PH BLDA: 7.42 [PH] (ref 7.4–7.5)
PH BLDA: 7.44 [PH] (ref 7.4–7.5)
PH TEMP ADJ BLDA: 7.31 [PH] (ref 7.4–7.5)
PH TEMP ADJ BLDA: 7.31 [PH] (ref 7.4–7.5)
PH TEMP ADJ BLDA: 7.33 [PH] (ref 7.4–7.5)
PH TEMP ADJ BLDA: 7.33 [PH] (ref 7.4–7.5)
PH TEMP ADJ BLDA: 7.36 [PH] (ref 7.4–7.5)
PH TEMP ADJ BLDA: 7.42 [PH] (ref 7.4–7.5)
PH TEMP ADJ BLDA: 7.42 [PH] (ref 7.4–7.5)
PH TEMP ADJ BLDA: 7.43 [PH] (ref 7.4–7.5)
PH TEMP ADJ BLDA: 7.47 [PH] (ref 7.4–7.5)
PLATELET # BLD AUTO: 194 K/UL (ref 164–446)
PO2 BLDA: 123 MMHG (ref 64–87)
PO2 BLDA: 144 MMHG (ref 64–87)
PO2 BLDA: 239 MMHG (ref 64–87)
PO2 BLDA: 310 MMHG (ref 64–87)
PO2 BLDA: 310 MMHG (ref 64–87)
PO2 BLDA: 318 MMHG (ref 64–87)
PO2 BLDA: 372 MMHG (ref 64–87)
PO2 BLDA: 384 MMHG (ref 64–87)
PO2 BLDA: >500 MMHG (ref 64–87)
PO2 TEMP ADJ BLDA: 120 MMHG (ref 64–87)
PO2 TEMP ADJ BLDA: 139 MMHG (ref 64–87)
PO2 TEMP ADJ BLDA: 236 MMHG (ref 64–87)
PO2 TEMP ADJ BLDA: 302 MMHG (ref 64–87)
PO2 TEMP ADJ BLDA: 307 MMHG (ref 64–87)
PO2 TEMP ADJ BLDA: 316 MMHG (ref 64–87)
PO2 TEMP ADJ BLDA: 368 MMHG (ref 64–87)
PO2 TEMP ADJ BLDA: 372 MMHG (ref 64–87)
PO2 TEMP ADJ BLDA: 491 MMHG (ref 64–87)
POTASSIUM BLD-SCNC: 3.4 MMOL/L (ref 3.6–5.5)
POTASSIUM BLD-SCNC: 3.8 MMOL/L (ref 3.6–5.5)
POTASSIUM BLD-SCNC: 3.8 MMOL/L (ref 3.6–5.5)
POTASSIUM BLD-SCNC: 4 MMOL/L (ref 3.6–5.5)
POTASSIUM BLD-SCNC: 5 MMOL/L (ref 3.6–5.5)
POTASSIUM BLD-SCNC: 5 MMOL/L (ref 3.6–5.5)
POTASSIUM BLD-SCNC: 5.2 MMOL/L (ref 3.6–5.5)
POTASSIUM SERPL-SCNC: 3.9 MMOL/L (ref 3.6–5.5)
POTASSIUM SERPL-SCNC: 4.3 MMOL/L (ref 3.6–5.5)
PROTHROMBIN TIME: 16.6 SEC (ref 12–14.6)
SAO2 % BLDA: 100 % (ref 93–99)
SAO2 % BLDA: 99 % (ref 93–99)
SAO2 % BLDA: 99 % (ref 93–99)
SODIUM BLD-SCNC: 136 MMOL/L (ref 135–145)
SODIUM BLD-SCNC: 137 MMOL/L (ref 135–145)
SODIUM BLD-SCNC: 137 MMOL/L (ref 135–145)
SODIUM BLD-SCNC: 140 MMOL/L (ref 135–145)
SODIUM BLD-SCNC: 140 MMOL/L (ref 135–145)
SODIUM BLD-SCNC: 141 MMOL/L (ref 135–145)
SODIUM BLD-SCNC: 142 MMOL/L (ref 135–145)
SPECIMEN DRAWN FROM PATIENT: ABNORMAL

## 2023-12-18 PROCEDURE — 06BQ4ZZ EXCISION OF LEFT SAPHENOUS VEIN, PERCUTANEOUS ENDOSCOPIC APPROACH: ICD-10-PCS | Performed by: THORACIC SURGERY (CARDIOTHORACIC VASCULAR SURGERY)

## 2023-12-18 PROCEDURE — 160048 HCHG OR STATISTICAL LEVEL 1-5: Performed by: THORACIC SURGERY (CARDIOTHORACIC VASCULAR SURGERY)

## 2023-12-18 PROCEDURE — 94002 VENT MGMT INPAT INIT DAY: CPT

## 2023-12-18 PROCEDURE — 700111 HCHG RX REV CODE 636 W/ 250 OVERRIDE (IP): Performed by: NURSE PRACTITIONER

## 2023-12-18 PROCEDURE — 85049 AUTOMATED PLATELET COUNT: CPT

## 2023-12-18 PROCEDURE — 82803 BLOOD GASES ANY COMBINATION: CPT | Mod: 91

## 2023-12-18 PROCEDURE — 700111 HCHG RX REV CODE 636 W/ 250 OVERRIDE (IP): Performed by: THORACIC SURGERY (CARDIOTHORACIC VASCULAR SURGERY)

## 2023-12-18 PROCEDURE — 93005 ELECTROCARDIOGRAM TRACING: CPT | Performed by: NURSE PRACTITIONER

## 2023-12-18 PROCEDURE — 85014 HEMATOCRIT: CPT | Mod: 91

## 2023-12-18 PROCEDURE — A9270 NON-COVERED ITEM OR SERVICE: HCPCS | Performed by: NURSE PRACTITIONER

## 2023-12-18 PROCEDURE — 700101 HCHG RX REV CODE 250

## 2023-12-18 PROCEDURE — 33535 CABG ARTERIAL THREE: CPT | Performed by: THORACIC SURGERY (CARDIOTHORACIC VASCULAR SURGERY)

## 2023-12-18 PROCEDURE — 88307 TISSUE EXAM BY PATHOLOGIST: CPT

## 2023-12-18 PROCEDURE — 99291 CRITICAL CARE FIRST HOUR: CPT | Performed by: INTERNAL MEDICINE

## 2023-12-18 PROCEDURE — 85018 HEMOGLOBIN: CPT

## 2023-12-18 PROCEDURE — 94150 VITAL CAPACITY TEST: CPT

## 2023-12-18 PROCEDURE — C1729 CATH, DRAINAGE: HCPCS | Performed by: THORACIC SURGERY (CARDIOTHORACIC VASCULAR SURGERY)

## 2023-12-18 PROCEDURE — A9270 NON-COVERED ITEM OR SERVICE: HCPCS | Performed by: THORACIC SURGERY (CARDIOTHORACIC VASCULAR SURGERY)

## 2023-12-18 PROCEDURE — C1751 CATH, INF, PER/CENT/MIDLINE: HCPCS | Performed by: THORACIC SURGERY (CARDIOTHORACIC VASCULAR SURGERY)

## 2023-12-18 PROCEDURE — 94799 UNLISTED PULMONARY SVC/PX: CPT

## 2023-12-18 PROCEDURE — 32505 WEDGE RESECT OF LUNG INITIAL: CPT | Performed by: THORACIC SURGERY (CARDIOTHORACIC VASCULAR SURGERY)

## 2023-12-18 PROCEDURE — 37799 UNLISTED PX VASCULAR SURGERY: CPT

## 2023-12-18 PROCEDURE — 700111 HCHG RX REV CODE 636 W/ 250 OVERRIDE (IP): Mod: JZ | Performed by: THORACIC SURGERY (CARDIOTHORACIC VASCULAR SURGERY)

## 2023-12-18 PROCEDURE — 33535 CABG ARTERIAL THREE: CPT | Mod: AS | Performed by: NURSE PRACTITIONER

## 2023-12-18 PROCEDURE — 71045 X-RAY EXAM CHEST 1 VIEW: CPT

## 2023-12-18 PROCEDURE — 700105 HCHG RX REV CODE 258: Performed by: THORACIC SURGERY (CARDIOTHORACIC VASCULAR SURGERY)

## 2023-12-18 PROCEDURE — 160042 HCHG SURGERY MINUTES - EA ADDL 1 MIN LEVEL 5: Performed by: THORACIC SURGERY (CARDIOTHORACIC VASCULAR SURGERY)

## 2023-12-18 PROCEDURE — 85730 THROMBOPLASTIN TIME PARTIAL: CPT

## 2023-12-18 PROCEDURE — 700111 HCHG RX REV CODE 636 W/ 250 OVERRIDE (IP): Mod: JG | Performed by: ANESTHESIOLOGY

## 2023-12-18 PROCEDURE — 700102 HCHG RX REV CODE 250 W/ 637 OVERRIDE(OP): Performed by: THORACIC SURGERY (CARDIOTHORACIC VASCULAR SURGERY)

## 2023-12-18 PROCEDURE — 160009 HCHG ANES TIME/MIN: Performed by: THORACIC SURGERY (CARDIOTHORACIC VASCULAR SURGERY)

## 2023-12-18 PROCEDURE — 700101 HCHG RX REV CODE 250: Performed by: THORACIC SURGERY (CARDIOTHORACIC VASCULAR SURGERY)

## 2023-12-18 PROCEDURE — 700105 HCHG RX REV CODE 258

## 2023-12-18 PROCEDURE — 700111 HCHG RX REV CODE 636 W/ 250 OVERRIDE (IP): Mod: JZ | Performed by: NURSE PRACTITIONER

## 2023-12-18 PROCEDURE — 770022 HCHG ROOM/CARE - ICU (200)

## 2023-12-18 PROCEDURE — 93010 ELECTROCARDIOGRAM REPORT: CPT | Performed by: STUDENT IN AN ORGANIZED HEALTH CARE EDUCATION/TRAINING PROGRAM

## 2023-12-18 PROCEDURE — 84132 ASSAY OF SERUM POTASSIUM: CPT

## 2023-12-18 PROCEDURE — 700105 HCHG RX REV CODE 258: Performed by: ANESTHESIOLOGY

## 2023-12-18 PROCEDURE — B24BZZ4 ULTRASONOGRAPHY OF HEART WITH AORTA, TRANSESOPHAGEAL: ICD-10-PCS | Performed by: ANESTHESIOLOGY

## 2023-12-18 PROCEDURE — 5A1221Z PERFORMANCE OF CARDIAC OUTPUT, CONTINUOUS: ICD-10-PCS | Performed by: THORACIC SURGERY (CARDIOTHORACIC VASCULAR SURGERY)

## 2023-12-18 PROCEDURE — 160031 HCHG SURGERY MINUTES - 1ST 30 MINS LEVEL 5: Performed by: THORACIC SURGERY (CARDIOTHORACIC VASCULAR SURGERY)

## 2023-12-18 PROCEDURE — 32505 WEDGE RESECT OF LUNG INITIAL: CPT | Mod: AS | Performed by: NURSE PRACTITIONER

## 2023-12-18 PROCEDURE — 93325 DOPPLER ECHO COLOR FLOW MAPG: CPT

## 2023-12-18 PROCEDURE — 700105 HCHG RX REV CODE 258: Performed by: NURSE PRACTITIONER

## 2023-12-18 PROCEDURE — 700101 HCHG RX REV CODE 250: Performed by: ANESTHESIOLOGY

## 2023-12-18 PROCEDURE — 502240 HCHG MISC OR SUPPLY RC 0272: Performed by: THORACIC SURGERY (CARDIOTHORACIC VASCULAR SURGERY)

## 2023-12-18 PROCEDURE — 82962 GLUCOSE BLOOD TEST: CPT | Mod: 91

## 2023-12-18 PROCEDURE — 700102 HCHG RX REV CODE 250 W/ 637 OVERRIDE(OP): Performed by: NURSE PRACTITIONER

## 2023-12-18 PROCEDURE — P9047 ALBUMIN (HUMAN), 25%, 50ML: HCPCS | Mod: JG

## 2023-12-18 PROCEDURE — 700111 HCHG RX REV CODE 636 W/ 250 OVERRIDE (IP)

## 2023-12-18 PROCEDURE — 503001 HCHG PERFUSION: Performed by: THORACIC SURGERY (CARDIOTHORACIC VASCULAR SURGERY)

## 2023-12-18 PROCEDURE — 36415 COLL VENOUS BLD VENIPUNCTURE: CPT

## 2023-12-18 PROCEDURE — 0BBG0ZZ EXCISION OF LEFT UPPER LUNG LOBE, OPEN APPROACH: ICD-10-PCS | Performed by: THORACIC SURGERY (CARDIOTHORACIC VASCULAR SURGERY)

## 2023-12-18 PROCEDURE — 85347 COAGULATION TIME ACTIVATED: CPT | Mod: 91

## 2023-12-18 PROCEDURE — C1898 LEAD, PMKR, OTHER THAN TRANS: HCPCS | Performed by: THORACIC SURGERY (CARDIOTHORACIC VASCULAR SURGERY)

## 2023-12-18 PROCEDURE — 02100Z9 BYPASS CORONARY ARTERY, ONE ARTERY FROM LEFT INTERNAL MAMMARY, OPEN APPROACH: ICD-10-PCS | Performed by: THORACIC SURGERY (CARDIOTHORACIC VASCULAR SURGERY)

## 2023-12-18 PROCEDURE — 85610 PROTHROMBIN TIME: CPT

## 2023-12-18 PROCEDURE — 02110A8 BYPASS CORONARY ARTERY, TWO ARTERIES FROM RIGHT INTERNAL MAMMARY WITH AUTOLOGOUS ARTERIAL TISSUE, OPEN APPROACH: ICD-10-PCS | Performed by: THORACIC SURGERY (CARDIOTHORACIC VASCULAR SURGERY)

## 2023-12-18 PROCEDURE — 83605 ASSAY OF LACTIC ACID: CPT

## 2023-12-18 PROCEDURE — 84295 ASSAY OF SERUM SODIUM: CPT | Mod: 91

## 2023-12-18 PROCEDURE — C9248 INJ, CLEVIDIPINE BUTYRATE: HCPCS | Mod: JG | Performed by: ANESTHESIOLOGY

## 2023-12-18 PROCEDURE — 82330 ASSAY OF CALCIUM: CPT

## 2023-12-18 PROCEDURE — 83735 ASSAY OF MAGNESIUM: CPT

## 2023-12-18 RX ORDER — INSULIN LISPRO 100 [IU]/ML
0-14 INJECTION, SOLUTION INTRAVENOUS; SUBCUTANEOUS
Status: DISCONTINUED | OUTPATIENT
Start: 2023-12-18 | End: 2023-12-19

## 2023-12-18 RX ORDER — NITROGLYCERIN 20 MG/100ML
0-100 INJECTION INTRAVENOUS CONTINUOUS
Status: DISCONTINUED | OUTPATIENT
Start: 2023-12-18 | End: 2023-12-20

## 2023-12-18 RX ORDER — ACETAMINOPHEN 500 MG
1000 TABLET ORAL ONCE
Status: COMPLETED | OUTPATIENT
Start: 2023-12-18 | End: 2023-12-18

## 2023-12-18 RX ORDER — CLOPIDOGREL BISULFATE 75 MG/1
75 TABLET ORAL DAILY
COMMUNITY
End: 2024-01-08 | Stop reason: SDUPTHER

## 2023-12-18 RX ORDER — SODIUM CHLORIDE, SODIUM GLUCONATE, SODIUM ACETATE, POTASSIUM CHLORIDE AND MAGNESIUM CHLORIDE 526; 502; 368; 37; 30 MG/100ML; MG/100ML; MG/100ML; MG/100ML; MG/100ML
INJECTION, SOLUTION INTRAVENOUS
Status: DISCONTINUED | OUTPATIENT
Start: 2023-12-18 | End: 2023-12-18 | Stop reason: SURG

## 2023-12-18 RX ORDER — AMOXICILLIN 250 MG
2 CAPSULE ORAL 2 TIMES DAILY
Status: DISCONTINUED | OUTPATIENT
Start: 2023-12-18 | End: 2023-12-23 | Stop reason: HOSPADM

## 2023-12-18 RX ORDER — PROTAMINE SULFATE 10 MG/ML
INJECTION, SOLUTION INTRAVENOUS PRN
Status: DISCONTINUED | OUTPATIENT
Start: 2023-12-18 | End: 2023-12-18 | Stop reason: SURG

## 2023-12-18 RX ORDER — CEFAZOLIN SODIUM 1 G/3ML
INJECTION, POWDER, FOR SOLUTION INTRAMUSCULAR; INTRAVENOUS PRN
Status: DISCONTINUED | OUTPATIENT
Start: 2023-12-18 | End: 2023-12-18 | Stop reason: SURG

## 2023-12-18 RX ORDER — OMEPRAZOLE 20 MG/1
20 CAPSULE, DELAYED RELEASE ORAL DAILY
Status: DISCONTINUED | OUTPATIENT
Start: 2023-12-19 | End: 2023-12-23 | Stop reason: HOSPADM

## 2023-12-18 RX ORDER — POLYETHYLENE GLYCOL 3350 17 G/17G
1 POWDER, FOR SOLUTION ORAL DAILY
Status: DISCONTINUED | OUTPATIENT
Start: 2023-12-19 | End: 2023-12-23 | Stop reason: HOSPADM

## 2023-12-18 RX ORDER — HEPARIN SODIUM,PORCINE 1000/ML
VIAL (ML) INJECTION PRN
Status: DISCONTINUED | OUTPATIENT
Start: 2023-12-18 | End: 2023-12-18 | Stop reason: SURG

## 2023-12-18 RX ORDER — MIDAZOLAM HYDROCHLORIDE 1 MG/ML
2 INJECTION INTRAMUSCULAR; INTRAVENOUS
Status: DISCONTINUED | OUTPATIENT
Start: 2023-12-18 | End: 2023-12-21

## 2023-12-18 RX ORDER — EPHEDRINE SULFATE 50 MG/ML
INJECTION, SOLUTION INTRAVENOUS PRN
Status: DISCONTINUED | OUTPATIENT
Start: 2023-12-18 | End: 2023-12-18 | Stop reason: SURG

## 2023-12-18 RX ORDER — MORPHINE SULFATE 4 MG/ML
4 INJECTION INTRAVENOUS
Status: DISCONTINUED | OUTPATIENT
Start: 2023-12-18 | End: 2023-12-21

## 2023-12-18 RX ORDER — SODIUM CHLORIDE, SODIUM GLUCONATE, SODIUM ACETATE, POTASSIUM CHLORIDE AND MAGNESIUM CHLORIDE 526; 502; 368; 37; 30 MG/100ML; MG/100ML; MG/100ML; MG/100ML; MG/100ML
INJECTION, SOLUTION INTRAVENOUS PRN
Status: DISCONTINUED | OUTPATIENT
Start: 2023-12-18 | End: 2023-12-21

## 2023-12-18 RX ORDER — OXYCODONE HYDROCHLORIDE 5 MG/1
5 TABLET ORAL
Status: DISCONTINUED | OUTPATIENT
Start: 2023-12-18 | End: 2023-12-23 | Stop reason: HOSPADM

## 2023-12-18 RX ORDER — PAPAVERINE HYDROCHLORIDE 30 MG/ML
INJECTION INTRAMUSCULAR; INTRAVENOUS
Status: DISCONTINUED | OUTPATIENT
Start: 2023-12-18 | End: 2023-12-18 | Stop reason: HOSPADM

## 2023-12-18 RX ORDER — MAGNESIUM SULFATE 1 G/100ML
1 INJECTION INTRAVENOUS DAILY
Status: COMPLETED | OUTPATIENT
Start: 2023-12-18 | End: 2023-12-20

## 2023-12-18 RX ORDER — SODIUM CHLORIDE 9 MG/ML
INJECTION, SOLUTION INTRAVENOUS
Status: DISCONTINUED | OUTPATIENT
Start: 2023-12-18 | End: 2023-12-18 | Stop reason: SURG

## 2023-12-18 RX ORDER — ACETAMINOPHEN 500 MG
1000 TABLET ORAL EVERY 6 HOURS PRN
Status: DISCONTINUED | OUTPATIENT
Start: 2023-12-28 | End: 2023-12-23 | Stop reason: HOSPADM

## 2023-12-18 RX ORDER — VANCOMYCIN HYDROCHLORIDE 1 G/20ML
INJECTION, POWDER, LYOPHILIZED, FOR SOLUTION INTRAVENOUS
Status: COMPLETED | OUTPATIENT
Start: 2023-12-18 | End: 2023-12-18

## 2023-12-18 RX ORDER — OXYCODONE HYDROCHLORIDE 10 MG/1
10 TABLET ORAL
Status: DISCONTINUED | OUTPATIENT
Start: 2023-12-18 | End: 2023-12-23 | Stop reason: HOSPADM

## 2023-12-18 RX ORDER — MIDAZOLAM HYDROCHLORIDE 1 MG/ML
INJECTION INTRAMUSCULAR; INTRAVENOUS PRN
Status: DISCONTINUED | OUTPATIENT
Start: 2023-12-18 | End: 2023-12-18 | Stop reason: SURG

## 2023-12-18 RX ORDER — POTASSIUM CHLORIDE 7.45 MG/ML
10 INJECTION INTRAVENOUS ONCE
Status: COMPLETED | OUTPATIENT
Start: 2023-12-18 | End: 2023-12-18

## 2023-12-18 RX ORDER — CLOPIDOGREL BISULFATE 75 MG/1
75 TABLET ORAL DAILY
Status: DISCONTINUED | OUTPATIENT
Start: 2023-12-19 | End: 2023-12-23 | Stop reason: HOSPADM

## 2023-12-18 RX ORDER — SODIUM CHLORIDE 9 MG/ML
INJECTION, SOLUTION INTRAVENOUS CONTINUOUS
Status: DISCONTINUED | OUTPATIENT
Start: 2023-12-18 | End: 2023-12-20

## 2023-12-18 RX ORDER — TRAMADOL HYDROCHLORIDE 50 MG/1
50 TABLET ORAL EVERY 4 HOURS PRN
Status: DISCONTINUED | OUTPATIENT
Start: 2023-12-18 | End: 2023-12-23 | Stop reason: HOSPADM

## 2023-12-18 RX ORDER — ALUMINA, MAGNESIA, AND SIMETHICONE 2400; 2400; 240 MG/30ML; MG/30ML; MG/30ML
30 SUSPENSION ORAL EVERY 4 HOURS PRN
Status: DISCONTINUED | OUTPATIENT
Start: 2023-12-18 | End: 2023-12-23 | Stop reason: HOSPADM

## 2023-12-18 RX ORDER — ACETAMINOPHEN 500 MG
1000 TABLET ORAL EVERY 6 HOURS
Status: DISCONTINUED | OUTPATIENT
Start: 2023-12-18 | End: 2023-12-23 | Stop reason: HOSPADM

## 2023-12-18 RX ORDER — ATORVASTATIN CALCIUM 40 MG/1
40 TABLET, FILM COATED ORAL
Status: DISCONTINUED | OUTPATIENT
Start: 2023-12-18 | End: 2023-12-23 | Stop reason: HOSPADM

## 2023-12-18 RX ORDER — SODIUM CHLORIDE 9 MG/ML
INJECTION, SOLUTION INTRAVENOUS CONTINUOUS
Status: DISCONTINUED | OUTPATIENT
Start: 2023-12-18 | End: 2023-12-21

## 2023-12-18 RX ORDER — ENOXAPARIN SODIUM 100 MG/ML
40 INJECTION SUBCUTANEOUS DAILY
Status: DISCONTINUED | OUTPATIENT
Start: 2023-12-19 | End: 2023-12-23 | Stop reason: HOSPADM

## 2023-12-18 RX ORDER — BISACODYL 10 MG
10 SUPPOSITORY, RECTAL RECTAL
Status: DISCONTINUED | OUTPATIENT
Start: 2023-12-18 | End: 2023-12-23 | Stop reason: HOSPADM

## 2023-12-18 RX ORDER — ATORVASTATIN CALCIUM 40 MG/1
40 TABLET, FILM COATED ORAL DAILY
COMMUNITY

## 2023-12-18 RX ORDER — DEXMEDETOMIDINE HYDROCHLORIDE 4 UG/ML
0-1.5 INJECTION, SOLUTION INTRAVENOUS CONTINUOUS
Status: DISCONTINUED | OUTPATIENT
Start: 2023-12-18 | End: 2023-12-18

## 2023-12-18 RX ORDER — NOREPINEPHRINE BITARTRATE 0.03 MG/ML
0-1 INJECTION, SOLUTION INTRAVENOUS CONTINUOUS
Status: DISCONTINUED | OUTPATIENT
Start: 2023-12-18 | End: 2023-12-20

## 2023-12-18 RX ORDER — ROCURONIUM BROMIDE 10 MG/ML
INJECTION, SOLUTION INTRAVENOUS PRN
Status: DISCONTINUED | OUTPATIENT
Start: 2023-12-18 | End: 2023-12-18 | Stop reason: SURG

## 2023-12-18 RX ORDER — SODIUM CHLORIDE, SODIUM LACTATE, POTASSIUM CHLORIDE, CALCIUM CHLORIDE 600; 310; 30; 20 MG/100ML; MG/100ML; MG/100ML; MG/100ML
INJECTION, SOLUTION INTRAVENOUS
Status: DISCONTINUED | OUTPATIENT
Start: 2023-12-18 | End: 2023-12-18 | Stop reason: SURG

## 2023-12-18 RX ORDER — METHADONE HYDROCHLORIDE 10 MG/ML
20 INJECTION, SOLUTION INTRAMUSCULAR; INTRAVENOUS; SUBCUTANEOUS ONCE
Status: COMPLETED | OUTPATIENT
Start: 2023-12-18 | End: 2023-12-18

## 2023-12-18 RX ORDER — DEXMEDETOMIDINE HYDROCHLORIDE 4 UG/ML
.1-1.5 INJECTION, SOLUTION INTRAVENOUS CONTINUOUS
Status: DISCONTINUED | OUTPATIENT
Start: 2023-12-18 | End: 2023-12-20

## 2023-12-18 RX ORDER — PROCHLORPERAZINE EDISYLATE 5 MG/ML
10 INJECTION INTRAMUSCULAR; INTRAVENOUS EVERY 6 HOURS PRN
Status: DISCONTINUED | OUTPATIENT
Start: 2023-12-18 | End: 2023-12-23 | Stop reason: HOSPADM

## 2023-12-18 RX ORDER — ONDANSETRON 2 MG/ML
8 INJECTION INTRAMUSCULAR; INTRAVENOUS EVERY 6 HOURS PRN
Status: DISCONTINUED | OUTPATIENT
Start: 2023-12-18 | End: 2023-12-23 | Stop reason: HOSPADM

## 2023-12-18 RX ORDER — DIPHENHYDRAMINE HCL 25 MG
25 TABLET ORAL
Status: DISCONTINUED | OUTPATIENT
Start: 2023-12-18 | End: 2023-12-23 | Stop reason: HOSPADM

## 2023-12-18 RX ORDER — ASPIRIN 81 MG/1
81 TABLET ORAL DAILY
Status: DISCONTINUED | OUTPATIENT
Start: 2023-12-19 | End: 2023-12-23 | Stop reason: HOSPADM

## 2023-12-18 RX ADMIN — Medication 1 APPLICATOR: at 21:04

## 2023-12-18 RX ADMIN — CEFAZOLIN 2 G: 2 INJECTION, POWDER, FOR SOLUTION INTRAMUSCULAR; INTRAVENOUS at 21:02

## 2023-12-18 RX ADMIN — CLEVIPIDINE 100 MCG: 0.5 EMULSION INTRAVENOUS at 08:56

## 2023-12-18 RX ADMIN — PROPOFOL 200 MG: 10 INJECTION, EMULSION INTRAVENOUS at 07:35

## 2023-12-18 RX ADMIN — SODIUM CHLORIDE: 9 INJECTION, SOLUTION INTRAVENOUS at 13:52

## 2023-12-18 RX ADMIN — SODIUM CHLORIDE 5 UNITS/HR: 9 INJECTION, SOLUTION INTRAVENOUS at 12:30

## 2023-12-18 RX ADMIN — METHADONE HYDROCHLORIDE 10 MG: 10 INJECTION, SOLUTION INTRAMUSCULAR; INTRAVENOUS; SUBCUTANEOUS at 07:33

## 2023-12-18 RX ADMIN — OXYCODONE HYDROCHLORIDE 10 MG: 10 TABLET ORAL at 21:03

## 2023-12-18 RX ADMIN — EPHEDRINE SULFATE 20 MG: 50 INJECTION, SOLUTION INTRAVENOUS at 07:42

## 2023-12-18 RX ADMIN — ROCURONIUM BROMIDE 20 MG: 50 INJECTION, SOLUTION INTRAVENOUS at 09:06

## 2023-12-18 RX ADMIN — ATORVASTATIN CALCIUM 40 MG: 40 TABLET, FILM COATED ORAL at 21:03

## 2023-12-18 RX ADMIN — ONDANSETRON 8 MG: 2 INJECTION INTRAMUSCULAR; INTRAVENOUS at 18:30

## 2023-12-18 RX ADMIN — SODIUM CHLORIDE: 9 INJECTION, SOLUTION INTRAVENOUS at 07:32

## 2023-12-18 RX ADMIN — CLEVIPIDINE 2 MG/HR: 0.5 EMULSION INTRAVENOUS at 09:23

## 2023-12-18 RX ADMIN — PROTAMINE SULFATE 300 MG: 10 INJECTION, SOLUTION INTRAVENOUS at 12:12

## 2023-12-18 RX ADMIN — CEFAZOLIN 2 G: 1 INJECTION, POWDER, FOR SOLUTION INTRAMUSCULAR; INTRAVENOUS at 08:05

## 2023-12-18 RX ADMIN — FENTANYL CITRATE 50 MCG: 50 INJECTION, SOLUTION INTRAMUSCULAR; INTRAVENOUS at 16:02

## 2023-12-18 RX ADMIN — ROCURONIUM BROMIDE 100 MG: 50 INJECTION, SOLUTION INTRAVENOUS at 07:35

## 2023-12-18 RX ADMIN — EPINEPHRINE 0.04 MCG/KG/MIN: 1 INJECTION INTRAMUSCULAR; INTRAVENOUS; SUBCUTANEOUS at 11:35

## 2023-12-18 RX ADMIN — Medication 1 APPLICATOR: at 14:04

## 2023-12-18 RX ADMIN — POTASSIUM CHLORIDE 10 MEQ: 7.46 INJECTION, SOLUTION INTRAVENOUS at 13:53

## 2023-12-18 RX ADMIN — DEXMEDETOMIDINE HYDROCHLORIDE 0.4 MCG/KG/HR: 100 INJECTION, SOLUTION INTRAVENOUS at 07:52

## 2023-12-18 RX ADMIN — CEFAZOLIN 2 G: 1 INJECTION, POWDER, FOR SOLUTION INTRAMUSCULAR; INTRAVENOUS at 12:05

## 2023-12-18 RX ADMIN — OXYCODONE HYDROCHLORIDE 5 MG: 5 TABLET ORAL at 17:33

## 2023-12-18 RX ADMIN — ROCURONIUM BROMIDE 20 MG: 50 INJECTION, SOLUTION INTRAVENOUS at 12:11

## 2023-12-18 RX ADMIN — MAGNESIUM SULFATE HEPTAHYDRATE 1 G: 1 INJECTION, SOLUTION INTRAVENOUS at 13:59

## 2023-12-18 RX ADMIN — EPHEDRINE SULFATE 20 MG: 50 INJECTION, SOLUTION INTRAVENOUS at 08:42

## 2023-12-18 RX ADMIN — ACETAMINOPHEN 1000 MG: 500 TABLET, FILM COATED ORAL at 06:49

## 2023-12-18 RX ADMIN — HEPARIN SODIUM 35000 UNITS: 1000 INJECTION, SOLUTION INTRAVENOUS; SUBCUTANEOUS at 09:55

## 2023-12-18 RX ADMIN — NOREPINEPHRINE BITARTRATE 0.04 MCG/KG/MIN: 1 INJECTION INTRAVENOUS at 11:38

## 2023-12-18 RX ADMIN — CLEVIPIDINE 200 MCG: 0.5 EMULSION INTRAVENOUS at 08:25

## 2023-12-18 RX ADMIN — MIDAZOLAM HYDROCHLORIDE 2 MG: 1 INJECTION, SOLUTION INTRAMUSCULAR; INTRAVENOUS at 07:33

## 2023-12-18 RX ADMIN — AMINOCAPROIC ACID 2 G/HR: 250 INJECTION, SOLUTION INTRAVENOUS at 09:16

## 2023-12-18 RX ADMIN — METHADONE HYDROCHLORIDE 10 MG: 10 INJECTION, SOLUTION INTRAMUSCULAR; INTRAVENOUS; SUBCUTANEOUS at 08:12

## 2023-12-18 RX ADMIN — METOPROLOL TARTRATE 12.5 MG: 25 TABLET, FILM COATED ORAL at 06:49

## 2023-12-18 RX ADMIN — SODIUM CHLORIDE: 9 INJECTION, SOLUTION INTRAVENOUS at 20:13

## 2023-12-18 RX ADMIN — SODIUM CHLORIDE, SODIUM GLUCONATE, SODIUM ACETATE, POTASSIUM CHLORIDE AND MAGNESIUM CHLORIDE: 526; 502; 368; 37; 30 INJECTION, SOLUTION INTRAVENOUS at 18:43

## 2023-12-18 RX ADMIN — SODIUM CHLORIDE, SODIUM GLUCONATE, SODIUM ACETATE, POTASSIUM CHLORIDE AND MAGNESIUM CHLORIDE: 526; 502; 368; 37; 30 INJECTION, SOLUTION INTRAVENOUS at 07:32

## 2023-12-18 RX ADMIN — ACETAMINOPHEN 1000 MG: 500 TABLET, FILM COATED ORAL at 17:33

## 2023-12-18 RX ADMIN — ROCURONIUM BROMIDE 30 MG: 50 INJECTION, SOLUTION INTRAVENOUS at 10:10

## 2023-12-18 RX ADMIN — AMINOCAPROIC ACID 8890 MG: 250 INJECTION, SOLUTION INTRAVENOUS at 08:05

## 2023-12-18 RX ADMIN — SODIUM CHLORIDE, POTASSIUM CHLORIDE, SODIUM LACTATE AND CALCIUM CHLORIDE: 600; 310; 30; 20 INJECTION, SOLUTION INTRAVENOUS at 07:32

## 2023-12-18 ASSESSMENT — PAIN DESCRIPTION - PAIN TYPE
TYPE: ACUTE PAIN;SURGICAL PAIN
TYPE: SURGICAL PAIN
TYPE: SURGICAL PAIN;ACUTE PAIN
TYPE: SURGICAL PAIN
TYPE: SURGICAL PAIN
TYPE: ACUTE PAIN;SURGICAL PAIN

## 2023-12-18 ASSESSMENT — PULMONARY FUNCTION TESTS: FVC: 1.8

## 2023-12-18 ASSESSMENT — COPD QUESTIONNAIRES
DURING THE PAST 4 WEEKS HOW MUCH DID YOU FEEL SHORT OF BREATH: SOME OF THE TIME
HAVE YOU SMOKED AT LEAST 100 CIGARETTES IN YOUR ENTIRE LIFE: NO/DON'T KNOW
COPD SCREENING SCORE: 3
DO YOU EVER COUGH UP ANY MUCUS OR PHLEGM?: NO/ONLY WITH OCCASIONAL COLDS OR INFECTIONS

## 2023-12-18 ASSESSMENT — FIBROSIS 4 INDEX: FIB4 SCORE: 0.65

## 2023-12-18 NOTE — CONSULTS
Critical Care Consultation    Date of consult: 12/18/2023    Referring Physician  SHEYLA Spence).    Reason for Consultation  Critical care management post CABG    Oistory of Presenting Illness  68 y.o. male w/PMHx CAD, DLD and hypertension who presented 12/18/2023 for elective CABG three-vessel.  Patient's history notable for exertional chest pain and abnormal cardiac catheterization significant for multivessel disease.  Case reviewed at the bedside with cardiac surgery and cardiac anesthesia postop.  Patient did well but venous grafts were unusable and he had bilateral mammary artery bypass with 3 vessels.  Hemodynamics acceptable postop.  Coming off pump RV and LV function was normal.  Pacer wires in place and functional but has not required pacing.  Sedate on dexmedetomidine coming to the unit.  On insulin at 6 units/h for hyperglycemia.  Alternating between low-dose clevidipine and low-dose norepinephrine for labile BP.  Urine output adequate.  Has not required any blood product infusion and received 350 of Cell Saver.    Code Status  Full Code    Review of Systems  Review of Systems   Unable to perform ROS: Intubated       Past Medical History   has a past medical history of Breath shortness, CAD (coronary artery disease), Fracture, Fracture, High cholesterol, Hyperlipidemia, Hypertension (12/12/2023), and Primary osteoarthritis of first carpometacarpal joint of right hand (12/13/2016).    He has no past medical history of Acute nasopharyngitis, Anesthesia, Anginal syndrome ([facility]), Arrhythmia, Asthma, At risk for sleep apnea, Blood clotting disorder ([facility]), Bowel habit changes, Bronchitis, Cancer ([facility]), Carcinoma in situ of respiratory system, Cataract, Cold, Congestive heart failure ([facility]), Continuous ambulatory peritoneal dialysis status ([facility]), Coughing blood, Dental disorder, Diabetes ([facility]), Dialysis patient ([facility]), Disorder of thyroid, Emphysema of lung ([facility]), Glaucoma, Gynecological disorder, Heart  burn, Heart murmur, Heart valve disease, Hemorrhagic disorder (HCC), Hepatitis A, Hepatitis B, Hepatitis C, Hiatus hernia syndrome, Indigestion, Jaundice, Myocardial infarct (HCC), Pacemaker, Pneumonia, Pregnant, Psychiatric problem, Renal disorder, Rheumatic fever, Seizure (HCC), Sleep apnea, Snoring, Stroke (HCC), Tuberculosis, Urinary bladder disorder, or Urinary incontinence.    Surgical History   has a past surgical history that includes shoulder surgery (Right); hand surgery (Left); appendectomy; arthroplasty, cmc joint, using flexor carpi radialis tendon (Right, 12/13/2016); vein ligation stripping bilateral; and other (1980).    Family History  family history includes Dementia in his father; Osteoporosis in his mother; Stroke in his father.    Social History   reports that he has never smoked. He has been exposed to tobacco smoke. He has never used smokeless tobacco. He reports that he does not drink alcohol and does not use drugs.    Medications  Home Medications       Reviewed by Hyacinth Ledesma R.N. (Registered Nurse) on 12/18/23 at 0807  Med List Status: Complete     Medication Last Dose Status   aminocaproic acid (Amicar) 5 g in  mL Infusion  Active   aminocaproic acid (Amicar) 8.89 g in  mL IV Bolus  Active   aspirin 81 MG EC tablet 12/16/2023 Active   atorvastatin (LIPITOR) 40 MG Tab 12/17/2023 Active   ceFAZolin (Ancef) 2 g in  mL IVPB  Active   clopidogrel (PLAVIX) 75 MG Tab 11/28/2023 Active   dexmedetomidine (PRECEDEX) 400 mcg/100mL NS premix infusion  Active   EPINEPHrine (Adrenalin) infusion 4 mg/250 mL (premix)  Active   insulin regular (Humulin R) 100 Units in  mL Infusion  Active   isosorbide mononitrate SR (IMDUR) 30 MG TABLET SR 24 HR 12/17/2023 Active   lidocaine (Xylocaine) 1 % injection 0.5 mL  Active   norepinephrine (Levophed) 8 mg in 250 mL NS infusion (premix)  Active                  Current Facility-Administered Medications   Medication Dose Route  Frequency Provider Last Rate Last Admin    Respiratory Therapy Consult   Nebulization Continuous RT MAR EliPRajendraRRajendraN.        NS infusion   Intravenous Continuous MAR EliPRajendraRRajendraN. 10 mL/hr at 12/18/23 1352 New Bag at 12/18/23 1352    NS infusion   Intravenous Continuous TOI EliRRajendraN. 30 mL/hr at 12/18/23 1341 Associate Infusion Pump at 12/18/23 1341    electrolyte-A (Plasmalyte-A) infusion   Intravenous PRN MAR EliPRajendraRANDRE        [START ON 12/19/2023] enoxaparin (Lovenox) inj 40 mg  40 mg Subcutaneous DAILY AT 1800 MAR EliPRajendraRANDRE        ceFAZolin (Ancef) 2 g in  mL IVPB  2 g Intravenous Once MAR EliPRajendraRANDRE        Nozin nasal  swab  1 Applicator Each Nostril BID MAR EliPRajendraRANTHONY.   1 Applicator at 12/18/23 1404    calcium CHLORIDE 10 % 1,000 mg in dextrose 5% 100 mL IVPB  1,000 mg Intravenous Once PRN MAR EliPRajendraRANDRE        magnesium sulfate in D5W IVPB premix 1 g  1 g Intravenous DAILY MAR EliPRajendraRANTHONY.   Stopped at 12/18/23 1459    K+ Scale: Goal of 4.5  1 Each Intravenous Q6HRS TOI EliRRajendraN.   1 Each at 12/18/23 1315    nitroglycerin 50 mg in D5W 250 ml infusion  0-100 mcg/min Intravenous Continuous MAR EliPRajendraRANDRE   Dose not Required at 12/18/23 1330    Pharmacy Consult Request ...Pain Management Review 1 Each  1 Each Other PHARMACY TO DOSE TOI EliRANDRE        acetaminophen (Tylenol) tablet 1,000 mg  1,000 mg Oral Q6HRS MAR EliPRajendraRANDRE        Followed by    [START ON 12/28/2023] acetaminophen (Tylenol) tablet 1,000 mg  1,000 mg Oral Q6HRS PRN CHALO Eli.P.R.N.        oxyCODONE immediate-release (Roxicodone) tablet 5 mg  5 mg Oral Q3HRS PRN CHALO Eli.P.R.N.        Or    oxyCODONE immediate release (Roxicodone) tablet 10 mg  10 mg Oral Q3HRS PRN CHALO Eil.P.R.N.        Or    fentaNYL (Sublimaze) injection 50 mcg  50 mcg  Intravenous Q3HRS PRN CHALO Eli.P.R.N.   50 mcg at 12/18/23 1602    traMADol (Ultram) 50 MG tablet 50 mg  50 mg Oral Q4HRS PRN CHALO Eli.P.R.N.        midazolam (Versed) injection 2 mg  2 mg Intravenous Q HOUR PRN Jennifer Beltran A.P.R.N.        sodium bicarbonate 8.4 % injection 50 mEq  50 mEq Intravenous Q HOUR PRN CHALO Eli.P.R.N.        morphine 4 MG/ML injection 4 mg  4 mg Intravenous Q HOUR PRN Jennifer Beltran A.P.R.N.        ondansetron (Zofran) syringe/vial injection 8 mg  8 mg Intravenous Q6HRS PRN CHALO Eli.P.R.N.        Or    prochlorperazine (Compazine) injection 10 mg  10 mg Intravenous Q6HRS PRN Jennifer Beltran A.P.R.N.        senna-docusate (Pericolace Or Senokot S) 8.6-50 MG per tablet 2 Tablet  2 Tablet Oral BID CHALO Eli.P.R.N.        And    [START ON 12/19/2023] polyethylene glycol/lytes (Miralax) Packet 1 Packet  1 Packet Oral DAILY CHALO Eli.P.R.N.        And    [START ON 12/20/2023] magnesium hydroxide (Milk Of Magnesia) suspension 30 mL  30 mL Oral DAILY CHALO Eli.P.R.N.        And    bisacodyl (Dulcolax) suppository 10 mg  10 mg Rectal QDAY PRN Jennifer Beltran A.P.R.N.        [START ON 12/19/2023] omeprazole (PriLOSEC) capsule 20 mg  20 mg Oral DAILY Jennifer Beltran A.P.R.N.        mag hydrox-al hydrox-simeth (Maalox Plus Es Or Mylanta Ds) suspension 30 mL  30 mL Oral Q4HRS PRN Jennifer Beltran A.P.R.N.        diphenhydrAMINE (Benadryl) tablet/capsule 25 mg  25 mg Oral HS PRN - MR X 1 TOI EliRANTHONY.        [START ON 12/19/2023] metoprolol tartrate (Lopressor) tablet 12.5 mg  12.5 mg Oral BID TOI EliRANTHONY.        Followed by    [START ON 12/20/2023] metoprolol tartrate (Lopressor) tablet 25 mg  25 mg Oral BID TOI EliRANTHONY.        [START ON 12/19/2023] aspirin EC tablet 81 mg  81 mg Oral DAILY MAR EliPRajendraRANTHONY.        [START ON 12/19/2023] clopidogrel (Plavix) tablet  75 mg  75 mg Oral DAILY GIO Eli        atorvastatin (Lipitor) tablet 40 mg  40 mg Oral QHS GIO Eli        norepinephrine (Levophed) 8 mg in 250 mL NS infusion (premix)  0-1 mcg/kg/min (Ideal) Intravenous Continuous LOPEZ Whitlock 2.8 mL/hr at 12/18/23 1410 0.02 mcg/kg/min at 12/18/23 1410    insulin regular (HumuLIN R,NovoLIN R) injection  0-14 Units Intravenous Once MAR WhitlockPANDRE        insulin lispro (HumaLOG,AdmeLOG) injection  0-14 Units Subcutaneous TID AC LOPEZ Whitlock        insulin regular (Humulin R) 100 Units in  mL Infusion  0-29 Units/hr Intravenous Continuous LOPEZ Whitlock   Stopped. (Insulin or Heparin) at 12/18/23 1547    dextrose 10 % BOLUS 12.5-25 g  12.5-25 g Intravenous PRN LOPEZ Whitlock MD Alert...Pharmacy to initiate transition from insulin infusion to subcutaneous insulin for cardiothoracic surgery 1 Each  1 Each Other Continuous LOPEZ Whitlock        clevidipine (Cleviprex) IV emulsion  0-21 mg/hr Intravenous Continuous MAR WhitlockPANDRE   Dose not Required at 12/18/23 1300    dexmedetomidine (PRECEDEX) 400 mcg/100mL NS premix infusion  0.1-1.5 mcg/kg/hr (Ideal) Intravenous Continuous MAR WhitlockPRajendraN.   Stopped at 12/18/23 1439       Allergies  No Known Allergies    Vital Signs last 24 hours  Temp:  [36.4 °C (97.5 °F)-36.6 °C (97.8 °F)] 36.4 °C (97.5 °F)  Pulse:  [61-69] 61  Resp:  [14-23] 17  BP: ()/(59-87) 96/60  SpO2:  [94 %-99 %] 99 %    Physical Exam  Physical Exam  Vitals reviewed.   Constitutional:       Appearance: Normal appearance. He is normal weight. He is ill-appearing.      Interventions: He is sedated and intubated.   HENT:      Head: Normocephalic and atraumatic.      Mouth/Throat:      Mouth: Mucous membranes are moist.   Eyes:      Pupils: Pupils are equal, round, and reactive to light.   Neck:      Comments: Right IJ CVC  Cardiovascular:      Rate  and Rhythm: Normal rate and regular rhythm.      Heart sounds: No murmur heard.     No gallop.   Pulmonary:      Effort: Pulmonary effort is normal. He is intubated.      Breath sounds: No wheezing, rhonchi or rales.   Chest:      Comments: Mediastinal chest tubes X2 and V wires  Abdominal:      General: Abdomen is flat. Bowel sounds are normal. There is no distension.      Palpations: Abdomen is soft.   Musculoskeletal:      Cervical back: Neck supple.      Right lower leg: No edema.      Left lower leg: No edema.      Comments: Surgical dressing left lower extremity, right radial arterial line   Lymphadenopathy:      Cervical: No cervical adenopathy.   Skin:     General: Skin is warm and dry.      Capillary Refill: Capillary refill takes less than 2 seconds.   Psychiatric:      Comments: MARGIE         Fluids    Intake/Output Summary (Last 24 hours) at 12/18/2023 1605  Last data filed at 12/18/2023 1447  Gross per 24 hour   Intake 1437.2 ml   Output 1619 ml   Net -181.8 ml       Laboratory  Recent Results (from the past 48 hour(s))   ABO Rh Confirm    Collection Time: 12/18/23  6:45 AM   Result Value Ref Range    ABO Rh Confirm O POS    POCT glucose device results    Collection Time: 12/18/23  8:09 AM   Result Value Ref Range    POC Glucose, Blood 90 65 - 99 mg/dL   POCT activated clotting time device results    Collection Time: 12/18/23  8:10 AM   Result Value Ref Range    Istat Activated Clotting Time 152 (H) 74 - 137 sec   POCT arterial blood gas device results    Collection Time: 12/18/23  8:11 AM   Result Value Ref Range    Ph 7.317 (L) 7.400 - 7.500    Pco2 44.5 (H) 26.0 - 37.0 mmHg    Po2 372 (H) 64 - 87 mmHg    Tco2 24 20 - 33 mmol/L    S02 100 (H) 93 - 99 %    Hco3 22.8 17.0 - 25.0 mmol/L    BE -3 -4 - 3 mmol/L    Body Temp 36.3 C degrees    Ph Temp Walter 7.327 (L) 7.400 - 7.500    Pco2 Temp Co 43.2 (H) 26.0 - 37.0 mmHg    Po2 Temp Cor 368 (H) 64 - 87 mmHg    Specimen Arterial    POCT sodium device results     Collection Time: 12/18/23  8:11 AM   Result Value Ref Range    Istat Sodium 142 135 - 145 mmol/L   POCT potassium device results    Collection Time: 12/18/23  8:11 AM   Result Value Ref Range    Istat Potassium 3.8 3.6 - 5.5 mmol/L   POCT ionized CA device results    Collection Time: 12/18/23  8:11 AM   Result Value Ref Range    Istat Ionized Calcium 1.22 1.10 - 1.30 mmol/L   POCT hematocrit and hemoglobin device results    Collection Time: 12/18/23  8:11 AM   Result Value Ref Range    Istat Hematocrit 41 (L) 42 - 52 %    Istat Hemoglobin 13.9 (L) 14.0 - 18.0 g/dL   POCT glucose device results    Collection Time: 12/18/23 10:03 AM   Result Value Ref Range    POC Glucose, Blood 93 65 - 99 mg/dL   POCT activated clotting time device results    Collection Time: 12/18/23 10:04 AM   Result Value Ref Range    Istat Activated Clotting Time 471 (H) 74 - 137 sec   POCT arterial blood gas device results    Collection Time: 12/18/23 10:04 AM   Result Value Ref Range    Ph 7.290 (LL) 7.400 - 7.500    Pco2 42.0 (H) 26.0 - 37.0 mmHg    Po2 310 (H) 64 - 87 mmHg    Tco2 21 20 - 33 mmol/L    S02 100 (H) 93 - 99 %    Hco3 20.2 17.0 - 25.0 mmol/L    BE -6 (L) -4 - 3 mmol/L    Body Temp 35.3 C degrees    Ph Temp Walter 7.314 (L) 7.400 - 7.500    Pco2 Temp Co 39.0 (H) 26.0 - 37.0 mmHg    Po2 Temp Cor 302 (H) 64 - 87 mmHg    Specimen Arterial    POCT sodium device results    Collection Time: 12/18/23 10:04 AM   Result Value Ref Range    Istat Sodium 141 135 - 145 mmol/L   POCT potassium device results    Collection Time: 12/18/23 10:04 AM   Result Value Ref Range    Istat Potassium 4.0 3.6 - 5.5 mmol/L   POCT ionized CA device results    Collection Time: 12/18/23 10:04 AM   Result Value Ref Range    Istat Ionized Calcium 1.12 1.10 - 1.30 mmol/L   POCT hematocrit and hemoglobin device results    Collection Time: 12/18/23 10:04 AM   Result Value Ref Range    Istat Hematocrit 38 (L) 42 - 52 %    Istat Hemoglobin 12.9 (L) 14.0 - 18.0 g/dL    POCT glucose device results    Collection Time: 12/18/23 10:51 AM   Result Value Ref Range    POC Glucose, Blood 88 65 - 99 mg/dL   POCT activated clotting time device results    Collection Time: 12/18/23 10:52 AM   Result Value Ref Range    Istat Activated Clotting Time 612 (H) 74 - 137 sec   POCT arterial blood gas device results    Collection Time: 12/18/23 10:53 AM   Result Value Ref Range    Ph 7.389 (L) 7.400 - 7.500    Pco2 36.8 26.0 - 37.0 mmHg    Po2 >500 (H) 64 - 87 mmHg    Tco2 23 20 - 33 mmol/L    S02 100 (H) 93 - 99 %    Hco3 22.2 17.0 - 25.0 mmol/L    BE -2 -4 - 3 mmol/L    Body Temp 34.7 C degrees    Ph Temp Walter 7.422 7.400 - 7.500    Pco2 Temp Co 33.2 26.0 - 37.0 mmHg    Po2 Temp Cor 491 (H) 64 - 87 mmHg    Specimen Arterial    POCT sodium device results    Collection Time: 12/18/23 10:53 AM   Result Value Ref Range    Istat Sodium 137 135 - 145 mmol/L   POCT potassium device results    Collection Time: 12/18/23 10:53 AM   Result Value Ref Range    Istat Potassium 5.2 3.6 - 5.5 mmol/L   POCT ionized CA device results    Collection Time: 12/18/23 10:53 AM   Result Value Ref Range    Istat Ionized Calcium 0.96 (L) 1.10 - 1.30 mmol/L   POCT hematocrit and hemoglobin device results    Collection Time: 12/18/23 10:53 AM   Result Value Ref Range    Istat Hematocrit 32 (L) 42 - 52 %    Istat Hemoglobin 10.9 (L) 14.0 - 18.0 g/dL   POCT activated clotting time device results    Collection Time: 12/18/23 11:17 AM   Result Value Ref Range    Istat Activated Clotting Time 579 (H) 74 - 137 sec   POCT glucose device results    Collection Time: 12/18/23 11:17 AM   Result Value Ref Range    POC Glucose, Blood 133 (H) 65 - 99 mg/dL   POCT arterial blood gas device results    Collection Time: 12/18/23 11:18 AM   Result Value Ref Range    Ph 7.439 7.400 - 7.500    Pco2 32.0 26.0 - 37.0 mmHg    Po2 384 (H) 64 - 87 mmHg    Tco2 23 20 - 33 mmol/L    S02 100 (H) 93 - 99 %    Hco3 21.7 17.0 - 25.0 mmol/L    BE -2 -4 - 3  mmol/L    Body Temp 34.8 C degrees    Ph Temp Walter 7.472 7.400 - 7.500    Pco2 Temp Co 29.0 26.0 - 37.0 mmHg    Po2 Temp Cor 372 (H) 64 - 87 mmHg    Specimen Arterial    POCT sodium device results    Collection Time: 12/18/23 11:18 AM   Result Value Ref Range    Istat Sodium 137 135 - 145 mmol/L   POCT potassium device results    Collection Time: 12/18/23 11:18 AM   Result Value Ref Range    Istat Potassium 5.0 3.6 - 5.5 mmol/L   POCT ionized CA device results    Collection Time: 12/18/23 11:18 AM   Result Value Ref Range    Istat Ionized Calcium 1.00 (L) 1.10 - 1.30 mmol/L   POCT hematocrit and hemoglobin device results    Collection Time: 12/18/23 11:18 AM   Result Value Ref Range    Istat Hematocrit 33 (L) 42 - 52 %    Istat Hemoglobin 11.2 (L) 14.0 - 18.0 g/dL   POCT glucose device results    Collection Time: 12/18/23 11:43 AM   Result Value Ref Range    POC Glucose, Blood 150 (H) 65 - 99 mg/dL   POCT activated clotting time device results    Collection Time: 12/18/23 11:44 AM   Result Value Ref Range    Istat Activated Clotting Time 655 (H) 74 - 137 sec   POCT arterial blood gas device results    Collection Time: 12/18/23 11:44 AM   Result Value Ref Range    Ph 7.416 7.400 - 7.500    Pco2 34.2 26.0 - 37.0 mmHg    Po2 310 (H) 64 - 87 mmHg    Tco2 23 20 - 33 mmol/L    S02 100 (H) 93 - 99 %    Hco3 22.0 17.0 - 25.0 mmol/L    BE -2 -4 - 3 mmol/L    Body Temp 36.4 C degrees    Ph Temp Walter 7.425 7.400 - 7.500    Pco2 Temp Co 33.3 26.0 - 37.0 mmHg    Po2 Temp Cor 307 (H) 64 - 87 mmHg    Specimen Arterial    POCT sodium device results    Collection Time: 12/18/23 11:44 AM   Result Value Ref Range    Istat Sodium 136 135 - 145 mmol/L   POCT potassium device results    Collection Time: 12/18/23 11:44 AM   Result Value Ref Range    Istat Potassium 5.0 3.6 - 5.5 mmol/L   POCT ionized CA device results    Collection Time: 12/18/23 11:44 AM   Result Value Ref Range    Istat Ionized Calcium 1.01 (L) 1.10 - 1.30 mmol/L   POCT  hematocrit and hemoglobin device results    Collection Time: 12/18/23 11:44 AM   Result Value Ref Range    Istat Hematocrit 33 (L) 42 - 52 %    Istat Hemoglobin 11.2 (L) 14.0 - 18.0 g/dL   Histology Request    Collection Time: 12/18/23 11:53 AM   Result Value Ref Range    Pathology Request Sent to Histo    POCT glucose device results    Collection Time: 12/18/23 12:26 PM   Result Value Ref Range    POC Glucose, Blood 183 (H) 65 - 99 mg/dL   POCT activated clotting time device results    Collection Time: 12/18/23 12:27 PM   Result Value Ref Range    Istat Activated Clotting Time 131 74 - 137 sec   POCT arterial blood gas device results    Collection Time: 12/18/23 12:28 PM   Result Value Ref Range    Ph 7.322 (L) 7.400 - 7.500    Pco2 40.6 (H) 26.0 - 37.0 mmHg    Po2 318 (H) 64 - 87 mmHg    Tco2 22 20 - 33 mmol/L    S02 100 (H) 93 - 99 %    Hco3 21.0 17.0 - 25.0 mmol/L    BE -5 (L) -4 - 3 mmol/L    Body Temp 36.6 C degrees    Ph Temp Walter 7.328 (L) 7.400 - 7.500    Pco2 Temp Co 39.9 (H) 26.0 - 37.0 mmHg    Po2 Temp Cor 316 (H) 64 - 87 mmHg    Specimen Arterial    POCT sodium device results    Collection Time: 12/18/23 12:28 PM   Result Value Ref Range    Istat Sodium 140 135 - 145 mmol/L   POCT potassium device results    Collection Time: 12/18/23 12:28 PM   Result Value Ref Range    Istat Potassium 3.4 (L) 3.6 - 5.5 mmol/L   POCT ionized CA device results    Collection Time: 12/18/23 12:28 PM   Result Value Ref Range    Istat Ionized Calcium 1.19 1.10 - 1.30 mmol/L   POCT hematocrit and hemoglobin device results    Collection Time: 12/18/23 12:28 PM   Result Value Ref Range    Istat Hematocrit 37 (L) 42 - 52 %    Istat Hemoglobin 12.6 (L) 14.0 - 18.0 g/dL   Platelet count    Collection Time: 12/18/23  1:05 PM   Result Value Ref Range    Platelet Count 194 164 - 446 K/uL   Magnesium    Collection Time: 12/18/23  1:05 PM   Result Value Ref Range    Magnesium 2.9 (H) 1.5 - 2.5 mg/dL   Prothrombin time (INR)     Collection Time: 23  1:05 PM   Result Value Ref Range    PT 16.6 (H) 12.0 - 14.6 sec    INR 1.33 (H) 0.87 - 1.13   APTT (PTT)    Collection Time: 23  1:05 PM   Result Value Ref Range    APTT 30.9 24.7 - 36.0 sec   HEMOGLOBIN AND HEMATOCRIT    Collection Time: 23  1:05 PM   Result Value Ref Range    Hemoglobin 14.1 14.0 - 18.0 g/dL    Hematocrit 42.3 42.0 - 52.0 %   POTASSIUM SERUM (K)    Collection Time: 23  1:05 PM   Result Value Ref Range    Potassium 3.9 3.6 - 5.5 mmol/L   POCT arterial blood gas device results    Collection Time: 23  1:08 PM   Result Value Ref Range    Ph 7.297 (LL) 7.400 - 7.500    Pco2 47.3 (H) 26.0 - 37.0 mmHg    Po2 239 (H) 64 - 87 mmHg    Tco2 25 20 - 33 mmol/L    S02 100 (H) 93 - 99 %    Hco3 23.1 17.0 - 25.0 mmol/L    BE -4 -4 - 3 mmol/L    Body Temp 36.4 C degrees    O2 Therapy 100 %    iPF Ratio 239     Ph Temp Walter 7.306 (L) 7.400 - 7.500    Pco2 Temp Co 46.1 (H) 26.0 - 37.0 mmHg    Po2 Temp Cor 236 (H) 64 - 87 mmHg    Specimen Arterial     Noel Test N/A     DelSys Vent     End Tidal Carbon Dioxide 42 mmhg    Peep End Expiratory Pressure 8 cmh20    Percent Minute Volume 160     Mode ASV    POCT sodium device results    Collection Time: 23  1:08 PM   Result Value Ref Range    Istat Sodium 140 135 - 145 mmol/L   POCT potassium device results    Collection Time: 23  1:08 PM   Result Value Ref Range    Istat Potassium 3.8 3.6 - 5.5 mmol/L   POCT ionized CA device results    Collection Time: 23  1:08 PM   Result Value Ref Range    Istat Ionized Calcium 1.27 1.10 - 1.30 mmol/L   EKG on arrival to CSU    Collection Time: 23  1:21 PM   Result Value Ref Range    Report       [facility]    Test Date:  2023  Pt Name:    GUERRERO URIBE               Department: 161  MRN:        [id number]                      Room:       29  Gender:     Male                         Technician: JAMILA  :        1955                   Requested  By:IQRA RODAS  Order #:    [id number]                    Reading MD:    Measurements  Intervals                                Axis  Rate:       65                           P:          50  VT:         204                          QRS:        76  QRSD:       87                           T:          50  QT:         433  QTc:        451    Interpretive Statements  Sinus rhythm  Low voltage, precordial leads  Compared to ECG 12/15/2023 10:02:07  Low QRS voltage now present  Sinus bradycardia no longer present     POCT arterial blood gas device results    Collection Time: 12/18/23  2:00 PM   Result Value Ref Range    Ph 7.417 7.400 - 7.500    Pco2 32.2 26.0 - 37.0 mmHg    Po2 144 (H) 64 - 87 mmHg    Tco2 22 20 - 33 mmol/L    S02 99 93 - 99 %    Hco3 20.7 17.0 - 25.0 mmol/L    BE -3 -4 - 3 mmol/L    Body Temp 36.3 C degrees    O2 Therapy 70 %    iPF Ratio 206     Ph Temp Walter 7.427 7.400 - 7.500    Pco2 Temp Co 31.2 26.0 - 37.0 mmHg    Po2 Temp Cor 139 (H) 64 - 87 mmHg    Specimen Arterial     Noel Test N/A     DelSys Vent     End Tidal Carbon Dioxide 32 mmhg    Peep End Expiratory Pressure 8 cmh20    Percent Minute Volume 160     Mode ASV    POCT lactate device results    Collection Time: 12/18/23  2:00 PM   Result Value Ref Range    iStat Lactate 1.7 0.5 - 2.0 mmol/L   POCT arterial blood gas device results    Collection Time: 12/18/23  3:11 PM   Result Value Ref Range    Ph 7.349 (L) 7.400 - 7.500    Pco2 37.1 (H) 26.0 - 37.0 mmHg    Po2 123 (H) 64 - 87 mmHg    Tco2 22 20 - 33 mmol/L    S02 99 93 - 99 %    Hco3 20.4 17.0 - 25.0 mmol/L    BE -5 (L) -4 - 3 mmol/L    Body Temp 36.5 C degrees    O2 Therapy 50 %    iPF Ratio 246     Ph Temp Walter 7.356 (L) 7.400 - 7.500    Pco2 Temp Co 36.3 26.0 - 37.0 mmHg    Po2 Temp Cor 120 (H) 64 - 87 mmHg    Specimen Arterial     Onel Test N/A     DelSys Vent     End Tidal Carbon Dioxide 37 mmhg    Peep End Expiratory Pressure 8 cmh20    Percent Minute Volume 130     Mode ASV     POCT lactate device results    Collection Time: 12/18/23  3:11 PM   Result Value Ref Range    iStat Lactate 1.2 0.5 - 2.0 mmol/L       Imaging  EC-BERNICE W/O CONT         DX-CHEST-LIMITED (1 VIEW)   Final Result         1. The right internal jugular central venous access catheter terminates over the right axillary vein; repositioning is recommended as clinically warranted.   2. The endotracheal tube terminates 12 mm above the alfonso; withdrawal 2.5 cm is recommended for optimal due to positioning.   3. Postsurgical changes of median sternotomy and cardiac revascularization, with no optimal mediastinal widening.   4. Small left pleural effusion. No pneumothorax bilaterally.      Findings were conveyed to the ordering physician on 12/18/2023 at 1330 hours.          Assessment/Plan  * S/P CABG x 3  Assessment & Plan  S/p bilateral mammary artery bypass to 3 vessels, see operative note  Dr. Bearden  Full vent support initially, hopefully wean and extubate in 4 to 6 hours  Titrating norepinephrine for hypotension  Titrating clevidipine for hypotension  Has required low-dose of both, currently on norepinephrine  V wires present, has not required pacing  Mediastinal chest tubes present, no air leak and minimal output  Plasma-Lyte fluid bolus as needed for relative hypovolemia  Note CVC is in IJ transitioning out into subclavian  Forced diuresis as needed  Transition to beta-blocker/ACE/DAPT  Lovenox PPx when okay with CVS  PPI PPx    Encounter for weaning from ventilator (HCC)  Assessment & Plan  Debated electively 12/18 by cardiac anesthesia for CABG  RT protocols  Ventilator bundle (ABCDEF)  ASV weaning protocol post cardiac surgery  SAT/SBT's as clinically appropriate  Serial ABG  Initial chest x-ray with ET tube a bit deep, pulled back 2 cm  Initial ABG with acceptable oxygenation and mild respiratory acidosis, ventilator adjusted    Hyperglycemia  Assessment & Plan  Insulin infusion initiated intraoperatively due to  hyperglycemia  Every hour glucose  Transition to SSI when clinically appropriate  Ideally 24-hour infusion and then transition  Hypoglycemia protocols  No Hx DM  A1c 5.7    Primary hypertension- (present on admission)  Assessment & Plan  Resume home regimen as clinically appropriate  Ideally ACE/BB post op    CAD (coronary artery disease)- (present on admission)  Assessment & Plan  History of multivessel disease by heart catheterization  Preoperative evaluation included:    ECHOCARDIOGRAM 11/20/23 [facility]:  1. The left ventricle is normal in size and systolic function with an ejection fraction of 55-60% by Santa's biplane with no wall motion abnormalities noted. There is grade 1 (impaired relaxation) diastolic function with normal LV filling pressures.  2. The right atrium size is mildly dilated  3. Mild aortic regurgitation  4. Mild mitral regurgitation  5. The ascending aorta measures 4.0 cm.  6. The right ventricle is normal in size and function; RVSP is normal at 33 mmHg with a RAP of 8 mmHg.     CARDIAC CATHETERIZATION 11/20/23 [facility]:  LEFT VENTRICLE: LVEDP was 16 mmHg, no significant gradient across the aortic valve on pullback.  LEFT MAIN: Large-caliber vessel bifurcating into an LAD and left circumflex without angiographically significant atherosclerotic disease  LAD: Large-caliber, heavily calcified vessel, the LAD has severe disease and  in the proximal segment after the take off of a diagonal. The LAD and the second diagonal fills via a collateral from the proximal RCA. The first diagonal is a medium sized vessel with 60-70% stenosis proximally.  LCX: Large-caliber vessel giving off 2 large OM branches and terminates in the AV groove. The Ostial and proximal LCX has 2 tandem 85-90% stenosis. The lesion is just before take off of an OM1. The OM2 has mild to moderate disease in the proximal segment.  RCA: The RCA is  in the proximal segment but supplies collateral to the LAD. The distal RCA fills via  collaterals from the AV groove LCX.    Final impressions:  - Severe calcified 3 vessel disease involving  of the proximal LAD,  of the proximal RCA and Severe disease of the LCX  - Collaterals to the LAD from the right system and Collaterals to the RCA from the AV groove LCX     Risk factor modification long-term    High cholesterol- (present on admission)  Assessment & Plan  Resume statin as clinically appropriate       Discussed patient condition and risk of morbidity and/or mortality with RN, RT, Pharmacy, Charge nurse / hot rounds, and CVS.    The patient remains critically ill.  Critical care time = 50 minutes in directly providing and coordinating critical care and extensive data review.  No time overlap and excludes procedures.

## 2023-12-18 NOTE — ANESTHESIA TIME REPORT
Anesthesia Start and Stop Event Times       Date Time Event    12/18/2023 0718 Ready for Procedure     0730 Anesthesia Start     1310 Anesthesia Stop          Responsible Staff  12/18/23      Name Role Begin End    Gagandeep Hickman M.D. Anesth 0730 1310          Overtime Reason:  no overtime (within assigned shift)    Comments:

## 2023-12-18 NOTE — OP REPORT
Operative report    PreOp Diagnosis: Multivessel coronary artery disease, stable angina pectoris, hypertension, hyperlipidemia, ascending aortic ectasia, COPD       PostOp Diagnosis: Same as above      Procedure(s):  CORONARY ARTERY BYPASS GRAFT X 3 WITH SKELETONIZED CALVERT TO LAD, ANAMARIA Y GRAFT TO OM1 AND 2  ,ENDOSCOPIC VEIN HARVEST of left greater saphenous vein- Wound Class: Clean with Drain  LEFT UPPER LOBE WEDGE RESECTION - Wound Class: Clean with Drain  ECHOCARDIOGRAM, TRANSESOPHAGEAL, INTRAOPERATIVE - Wound Class: None      Surgeon(s):  Josh Bearden D.O.    Anesthesiologist/Type of Anesthesia:  Anesthesiologist: Gagandeep Hickman M.D./General    Surgical Staff:  Assistant: GIO Eli  Circulator: Hyacinth Ledesma R.N.  Perfusionist: Mikhail Puente Circulator: Hafsa Rivera R.N.  Scrub Person: Rossy Arizmendi; Kait Antonio    Specimens removed if any:  ID Type Source Tests Collected by Time Destination   A : Left upper lobe Tissue Lung PATHOLOGY SPECIMEN, GROSS ONLY REQUEST Josh Bearden D.O. 12/18/2023 11:53 AM        Estimated Blood Loss: Cardiopulmonary bypass    Findings:     Pre and postoperative echo showed good regional wall motion with normal ejection fraction.  Mild aortic insufficiency.  No other valvular abnormalities that were significant.    Both mammary arteries are graded 2 mm size with excellent flow.  Left greater saphenous vein was dissected and the thigh portion harvested, but it was of such poor quality secondary to the patient's previous ablation procedures that it was not usable.    Right coronary artery was calcified throughout its entire course and diminutive on the inferior wall.  There were no bypassable targets.  LAD was a good target with heavy calcification proximally.  OM 1 and 2 were good quality targets with heavy proximal calcification and minimal distally.    Flow down the mammary arteries was tested with hand-injection for  the ANAMARIA Y graft which flowed well and was hemostatic.  LIMA had excellent flow noted distal to the anastomosis indicated patency.  Doppler after protamine was given off-pump showed good triphasic flow in the proximal LIMA and down the ANAMARIA Y graft and the LIMA post anastomosis.    At the completion of the procedure, the lungs were reexpanded.  There was severe tension on the LIMA secondary to the patient's hyperinflated lungs.  At that point I determined to perform a wedge resection in order to not have any potential compression on the LIMA.  I conferred with our thoracic surgeon Dr. Ganser and resected a small portion of the left upper lobe allowing for good lie of the LIMA graft.    The patient did not require defibrillation to resume a normal sinus rhythm, and  from bypass without issue.    Vancomycin was applied to the sternum    Complications: None immediate    Patient condition: Guarded to ICU    Chest tubes: Mediastinal: 32 Mauritian x2    Endoscopic vein harvest: Left greater saphenous vein.    Pacing wires: RV x 2    Pericardium: Open    Cross-clamp time: 57 minutes    Pump time: 92 minutes    Cardioplegia: Cold blood antegrade [place] cardioplegia given.  Initial induction with 1000 mL.  300 cc of warm blood given as antegrade hotshot    Vent: Aortic root      Procedure:    After informed consent was obtained, the patient was brought to the operating room.  They were placed in supine position on the operating table.  General anesthesia was induced via endotracheal tube.  Lines, arterial line, Sauer were placed by the nursing and anesthesia teams.  They received pre-incision antibiotics and beta-blockade.  The patient was prepped in a sterile fashion from their chin to their feet.  Drapes were placed in a sterile fashion.  The procedure was begun with a timeout.    I made a sternotomy incision with a 10 blade scalpel.  Concurrently to this, MIREILLE Hood harvested the saphenous vein in the  usual fashion.  Once removed from the leg, the leg was closed in standard fashion.  The vein was then prepped on the back table.  The then join me at the head of the table and assisted in all aspects of the case.    I deepened my sternal incision with left cautery to the sternum.  The sternum was divided in the midline using the sternal saw.  Hemostasis of the sternal edges was obtained using bone putty and electrocautery.  The mammary retractor was brought up at that point and the left hemisternum was elevated.  Using the electrocautery with occasional clips for side branches, the mammary was dissected down from its takeoff at the subclavian down to its bifurcation into the muscular phrenic and inferior epigastric, in a skeletonized fashion.   This was left in situ, and I switched sides.  The right internal mammary artery was also skeletonized and dissected out.  Once the mammary was completely dissected out, systemic heparinization was performed, using 400 units/kg of heparin, to achieve an ACT greater than 480 seconds. The mammary was ligated distally and divided.  There was excellent flow noted.  The proximal portion was ligated with 3 clips.  The distal portion of the LIMA was then ligated with 2 medium clips,  and I treated it with papaverine, and placed it back in the left chest for safe keeping.  The distal stump was secured.  The mammary retractor was removed and sternal retractor was placed in the chest open.    With the chest open, I then dissected out the innominate vein and the pericardium.  I then opened the pericardium and inverted T fashion.  I created pericardial well, suspending the pericardial edges from the skin edges using interrupted 2-0 Ethibond sutures.  Inspection of the heart and the aorta revealed no obvious pathology.  Palpation of the aorta revealed no calcifications precluding cannulation or cross-clamp.  I cannulated the aorta using a direct Seldinger's technique and BERNICE guidance.     Next, I placed the venous cannula into the IVC via the right atrium.  Again this was confirmed with BERNICE.  Next, I then placed a pursestring on the mid ascending aorta, through which I placed my antegrade needle.      With my cannulas in place, I then inspected the vein only a small portion was able to be used secondary to the patient's previous ablation procedures.  It was of exceedingly poor quality and not usable for bypass..  Cardiopulmonary bypass was then initiated.    Once on full flow, I then retracted the heart to inspect my targets.  They are noted above.  These were each marked with the Tippecanoe blade.  I then dissected out the IVC and brought the heart strep through the oblique sinus followed by running it through the transverse sinus.  The heart strep was used throughout the case to help position the heart.  Finally, I dissected the pulmonary artery away from the distal portion of the aorta to facilitate cross-clamp placement and complete isolation of the heart.  I then placed a cross-clamp and the heart was arrested.  Ice slush was placed on the heart to assist with cooling.    After I had arrest, the heart was repositioned to expose the distal right and the PDA.  I dissected out branches looking for a target for bypass, but there were none that were amenable or large enough for use.  The right side was abandoned.    The heart was then repositioned to expose the OM branchES.  I dissected  out OM 2 with the Tippecanoe blade, and then opened it. An end vein to side artery anastomosis was created with a running 7-0 Prolene suture.    I then used vein solution to distend the ANAMARIA graft to OM1.  OM1 was then opened longitudinally as well as the ANAMARIA graft after measuring it appropriately.  A side-to-side ranjan anastomosis was then created.      The heart was repositioned again to expose the LAD.  I dissected this out with a Tippecanoe blade and opened it with 11 blade scalpel.  I extended the arteriotomy with Diego  scissors.  I then created a keyhole defect in the pericardium through which I brought my mammary pedicle.  I then sized it to the site of the anastomosis and divided mammary pedicle, discarding the distal portion.  I then fashioned the distal end of the mammary.  An end LIMA to side LAD anastomosis was created with a running 7-0 Prolene suture.  Upon completion, the pedicle was opened and flow was noted to run distal.  It was also noted to be hemostatic.  Bulldog clamp was reapplied and I tacked the pedicle to the epicardium with interrupted 6-0 Prolene sutures.    At that point, I measured the ANAMARIA graft to the anterior surface of the LIMA.  I divided it and then open the LIMA longitudinally between 2 clamps.  I then anastomose the ANAMARIA in end-to-side fashion to the LIMA.  Clamps were removed and testing showed good hemostatic anastomosis with brisk flow down both grafts.    Patient was de-aired, flows dropped, and cross-clamp removed.  As the heart reperfused the placement of right ventricular pacing leads and my mediastinal chest tubes.  They were brought out through the epigastrium in the usual fashion.  Surgical sites were checked and found to be hemostatic.      At this point, I noted that the tension on the LIMA pedicle from the expanded lung was not acceptable for safe termination of the procedure.  No amount of positioning the lung of the graft was able to be tension free.  At that point I determined that a wedge resection would have to be performed in order to safely allow the LIMA to lie in its position.  I did confirm with our thoracic surgeon about any options as well as staple loads to use.  I then resected the left upper lobe in a wedge fashion using 3 firings of a 45 mm blue loaded Endo PANCHO stapler.  Surgical glue was applied to the staple line.  Once that was performed the LIMA had excellent lie with no tension on it.    The patient was then  from bypass in the usual fashion.  Venous  cannula and aortic root vent were removed.  Protamine was administered to reverse systemic heparinization.  As the protamine was administered, the patient's blood volume in the pump was administered back to the aortic cannula.  Once that was given back, that cannula was removed.  Sites were oversewn as needed.  Hemostasis was verified to be good.  Proceeded with closure of the sternum using interrupted #5 sternal wires.  The wound was then irrigated and closed in layers using absorbable sutures.  The patient tolerated the procedure well, all instrument counts were correct x2, and he was taken to the ICU in guarded condition.          12/18/2023 12:38 PM Josh Bearden D.O.

## 2023-12-18 NOTE — ANESTHESIA PREPROCEDURE EVALUATION
" Case: [id number] Date/Time: 12/18/23 0715    Procedures:       CORONARY ARTERY BYPASS GRAFT X 3-4, POSSIBLE ASCENDING AORTIC ANEURYSM REPAIR, ENDOSCOPIC VEIN HARVEST, TRANSESOPHAGEAL ECHOCARDIOGRAM, POSSIBLE RADIAL ARTERY HARVEST      ECHOCARDIOGRAM, TRANSESOPHAGEAL, INTRAOPERATIVE      REPAIR, ANEURYSM OR DISSECTION, AORTA, ASCENDING    Pre-op diagnosis: CAD    Location: [facility] OR 02 / SURGERY [facility]    Surgeons: Josh Bearden D.O.            Relevant Problems   CARDIAC   (positive) CAD (coronary artery disease)     BP (!) 148/87   Pulse 63   Temp 36.6 °C (97.8 °F) (Temporal)   Resp 14   Ht 1.791 m (5' 10.5\")   Wt 87.1 kg (192 lb 0.3 oz)   SpO2 94%   BMI 27.16 kg/m²     Physical Exam    Airway   Mallampati: II  TM distance: >3 FB  Neck ROM: full       Cardiovascular - normal exam  Rhythm: regular  Rate: normal  (-) murmur     Dental - normal exam           Pulmonary - normal exam  Breath sounds clear to auscultation     Abdominal    Neurological - normal exam                   Anesthesia Plan    ASA 4   ASA physical status 4 criteria: CAD/stents - recent (< 3 months)    Plan - general       Airway plan will be ETT          Induction: intravenous    Postoperative Plan: Postoperative administration of opioids is intended.    Pertinent diagnostic labs and testing reviewed    Informed Consent:    Anesthetic plan and risks discussed with patient.    Use of blood products discussed with: patient whom consented to blood products.           "

## 2023-12-18 NOTE — ANESTHESIA PROCEDURE NOTES
Central Venous Line    Performed by: Gagandeep Hickman M.D.  Authorized by: Gagandeep Hickman M.D.    Start Time:  12/18/2023 7:45 AM  End Time:  12/18/2023 7:49 AM  Patient Location:  OR  Indication: central venous access and hemodynamic monitoring        provider hand hygiene performed prior to central venous catheter insertion, all 5 sterile barriers used (gloves, gown, cap, mask, large sterile drape) during central venous catheter insertion and skin prep agent completely dried prior to procedure    Patient Position:  Trendelenburg  Laterality:  Right  Site:  Internal jugular  Prep:  Chlorhexidine  Catheter Size:  7 Fr  Catheter Length (cm):  20  Number of Lumens:  Triple lumen  target vein identified, needle advanced into vein and blood aspirated and guidewire advanced into vein    Seldinger Technique?: Yes    Ultrasound-Guided: ultrasound-guided  Image captured, interpreted and electronically stored.  Sterile Gel and Probe Cover Used for Ultrasound?: Yes    Intravenous Verification: verified by ultrasound, venous blood return and chest x-ray pending    all ports aspirated, all ports flushed easily, guidewire was removed intact, biopatch was applied, line was sutured in place and dressing was applied    Events: patient tolerated procedure well with no complications    PA Catheter Placed?: No

## 2023-12-18 NOTE — H&P
Pre op labs and imaging reviewed. No interval changes to H&P. Proceed with CABG x3-4, EVH, possible  ascending aortic aneurysm repair, and BERNICE. Beta blocker this morning.

## 2023-12-18 NOTE — PROGRESS NOTES
Pt arrived to room at 1300.  Accompanied by OR team.  Bedside report received from Dr. Hickman.  Pt connected to monitor. Hemodynamically stable on Dex @0.04mcg/kg/hr, Levo @0.02 and Insulin @5units/hr. Labs sent. Chest tube connected to suction. Initial I&Os recorded.  Initial blood gas and blood sugar obtained.  EKG done. SCDs applied. Discussed pt condition and POC. Questions answered.

## 2023-12-18 NOTE — ANESTHESIA PROCEDURE NOTES
BERNICE    Date/Time: 12/18/2023 7:50 AM    Performed by: Gagandeep Hickman M.D.  Authorized by: Gagandeep Hickman M.D.    Start Time:12/18/2023 7:50 AM  Preanesthetic Checklist: patient identified, IV checked, site marked, risks and benefits discussed, surgical consent, monitors and equipment checked, pre-op evaluation and timeout performed    Indication for BERNICE: diagnostic   Patient Location: OR  Intubated: Yes  Bite Block: Yes  Heart Visualized: Yes  Insertion: atraumatic    **See FULL BERNICE report in patient's chart via CV Synapse**

## 2023-12-18 NOTE — PROGRESS NOTES
Med Rec complete per pt  Allergies Reviewed    Pt reports NOT taking anticoagulant in the last 14 days  Anticoagulant: PLAVIX, Last dose: 11/28

## 2023-12-18 NOTE — ANESTHESIA PROCEDURE NOTES
Arterial Line    Performed by: Gagandeep Hickman M.D.  Authorized by: Gagandeep Hickman M.D.    Start Time:  12/18/2023 7:40 AM  End Time:  12/18/2023 7:42 AM  Localization: surface landmarks    Patient Location:  OR  Indication: continuous blood pressure monitoring        Catheter Size:  20 G  Seldinger Technique?: Yes    Laterality:  Right  Site:  Radial artery  Line Secured:  Antimicrobial disc, tape and transparent dressing  Events: patient tolerated procedure well with no complications

## 2023-12-18 NOTE — ANESTHESIA PROCEDURE NOTES
Airway    Date/Time: 12/18/2023 7:36 AM    Performed by: Gagandeep Hickman M.D.  Authorized by: Gagandeep Hickman M.D.    Location:  OR  Urgency:  Elective  Difficult Airway: No    Indications for Airway Management:  Anesthesia      Spontaneous Ventilation: absent    Sedation Level:  Deep  Preoxygenated: Yes    Patient Position:  Sniffing  Mask Difficulty Assessment:  0 - not attempted  Final Airway Type:  Endotracheal airway  Final Endotracheal Airway:  ETT  Cuffed: Yes    Technique Used for Successful ETT Placement:  Direct laryngoscopy    Insertion Site:  Oral  Blade Type:  Nataly  Laryngoscope Blade/Videolaryngoscope Blade Size:  3  ETT Size (mm):  8.5  Measured from:  Teeth  ETT to Teeth (cm):  22  Placement Verified by: auscultation and capnometry    Cormack-Lehane Classification:  Grade I - full view of glottis  Number of Attempts at Approach:  1         Attending Only I have personally seen and examined this patient.  I have fully participated in the care of this patient. I have reviewed all pertinent clinical information, including history, physical exam, plan and the Resident’s note and agree except as noted. I have personally seen and examined this patient.  I have fully participated in the care of this patient. I have reviewed all pertinent clinical information, including history, physical exam, plan and the Resident’s note and agree except as noted.

## 2023-12-19 ENCOUNTER — APPOINTMENT (OUTPATIENT)
Dept: RADIOLOGY | Facility: MEDICAL CENTER | Age: 68
DRG: 236 | End: 2023-12-19
Attending: NURSE PRACTITIONER
Payer: MEDICARE

## 2023-12-19 LAB
ANION GAP SERPL CALC-SCNC: 11 MMOL/L (ref 7–16)
BUN SERPL-MCNC: 20 MG/DL (ref 8–22)
CALCIUM SERPL-MCNC: 7.9 MG/DL (ref 8.5–10.5)
CHLORIDE SERPL-SCNC: 106 MMOL/L (ref 96–112)
CO2 SERPL-SCNC: 22 MMOL/L (ref 20–33)
CREAT SERPL-MCNC: 0.7 MG/DL (ref 0.5–1.4)
EKG IMPRESSION: NORMAL
ERYTHROCYTE [DISTWIDTH] IN BLOOD BY AUTOMATED COUNT: 44.3 FL (ref 35.9–50)
GFR SERPLBLD CREATININE-BSD FMLA CKD-EPI: 100 ML/MIN/1.73 M 2
GLUCOSE BLD STRIP.AUTO-MCNC: 103 MG/DL (ref 65–99)
GLUCOSE BLD STRIP.AUTO-MCNC: 110 MG/DL (ref 65–99)
GLUCOSE BLD STRIP.AUTO-MCNC: 116 MG/DL (ref 65–99)
GLUCOSE BLD STRIP.AUTO-MCNC: 118 MG/DL (ref 65–99)
GLUCOSE BLD STRIP.AUTO-MCNC: 119 MG/DL (ref 65–99)
GLUCOSE BLD STRIP.AUTO-MCNC: 122 MG/DL (ref 65–99)
GLUCOSE BLD STRIP.AUTO-MCNC: 123 MG/DL (ref 65–99)
GLUCOSE BLD STRIP.AUTO-MCNC: 131 MG/DL (ref 65–99)
GLUCOSE BLD STRIP.AUTO-MCNC: 132 MG/DL (ref 65–99)
GLUCOSE BLD STRIP.AUTO-MCNC: 133 MG/DL (ref 65–99)
GLUCOSE BLD STRIP.AUTO-MCNC: 145 MG/DL (ref 65–99)
GLUCOSE BLD STRIP.AUTO-MCNC: 146 MG/DL (ref 65–99)
GLUCOSE SERPL-MCNC: 122 MG/DL (ref 65–99)
HCT VFR BLD AUTO: 36.5 % (ref 42–52)
HGB BLD-MCNC: 12.2 G/DL (ref 14–18)
LV EJECT FRACT  99904: 55
MCH RBC QN AUTO: 32.7 PG (ref 27–33)
MCHC RBC AUTO-ENTMCNC: 33.4 G/DL (ref 32.3–36.5)
MCV RBC AUTO: 97.9 FL (ref 81.4–97.8)
PLATELET # BLD AUTO: 196 K/UL (ref 164–446)
PMV BLD AUTO: 10.5 FL (ref 9–12.9)
POTASSIUM SERPL-SCNC: 4.4 MMOL/L (ref 3.6–5.5)
POTASSIUM SERPL-SCNC: 4.6 MMOL/L (ref 3.6–5.5)
POTASSIUM SERPL-SCNC: 4.9 MMOL/L (ref 3.6–5.5)
RBC # BLD AUTO: 3.73 M/UL (ref 4.7–6.1)
SODIUM SERPL-SCNC: 139 MMOL/L (ref 135–145)
WBC # BLD AUTO: 9.6 K/UL (ref 4.8–10.8)

## 2023-12-19 PROCEDURE — 93005 ELECTROCARDIOGRAM TRACING: CPT | Performed by: NURSE PRACTITIONER

## 2023-12-19 PROCEDURE — 82962 GLUCOSE BLOOD TEST: CPT | Mod: 91

## 2023-12-19 PROCEDURE — 80048 BASIC METABOLIC PNL TOTAL CA: CPT

## 2023-12-19 PROCEDURE — 99291 CRITICAL CARE FIRST HOUR: CPT | Performed by: INTERNAL MEDICINE

## 2023-12-19 PROCEDURE — 700102 HCHG RX REV CODE 250 W/ 637 OVERRIDE(OP): Mod: JZ | Performed by: NURSE PRACTITIONER

## 2023-12-19 PROCEDURE — A9270 NON-COVERED ITEM OR SERVICE: HCPCS | Mod: JZ | Performed by: NURSE PRACTITIONER

## 2023-12-19 PROCEDURE — 71045 X-RAY EXAM CHEST 1 VIEW: CPT

## 2023-12-19 PROCEDURE — 84132 ASSAY OF SERUM POTASSIUM: CPT

## 2023-12-19 PROCEDURE — 770022 HCHG ROOM/CARE - ICU (200)

## 2023-12-19 PROCEDURE — 85027 COMPLETE CBC AUTOMATED: CPT

## 2023-12-19 PROCEDURE — P9045 ALBUMIN (HUMAN), 5%, 250 ML: HCPCS | Mod: JZ,JG | Performed by: NURSE PRACTITIONER

## 2023-12-19 PROCEDURE — 700102 HCHG RX REV CODE 250 W/ 637 OVERRIDE(OP): Performed by: NURSE PRACTITIONER

## 2023-12-19 PROCEDURE — 93010 ELECTROCARDIOGRAM REPORT: CPT | Performed by: STUDENT IN AN ORGANIZED HEALTH CARE EDUCATION/TRAINING PROGRAM

## 2023-12-19 PROCEDURE — A9270 NON-COVERED ITEM OR SERVICE: HCPCS | Performed by: NURSE PRACTITIONER

## 2023-12-19 PROCEDURE — 94669 MECHANICAL CHEST WALL OSCILL: CPT

## 2023-12-19 PROCEDURE — 700111 HCHG RX REV CODE 636 W/ 250 OVERRIDE (IP): Mod: JZ,JG | Performed by: NURSE PRACTITIONER

## 2023-12-19 PROCEDURE — 700105 HCHG RX REV CODE 258: Performed by: NURSE PRACTITIONER

## 2023-12-19 PROCEDURE — 700111 HCHG RX REV CODE 636 W/ 250 OVERRIDE (IP): Mod: JZ | Performed by: NURSE PRACTITIONER

## 2023-12-19 PROCEDURE — 99024 POSTOP FOLLOW-UP VISIT: CPT | Performed by: NURSE PRACTITIONER

## 2023-12-19 RX ORDER — FUROSEMIDE 10 MG/ML
40 INJECTION INTRAMUSCULAR; INTRAVENOUS
Status: DISCONTINUED | OUTPATIENT
Start: 2023-12-19 | End: 2023-12-21

## 2023-12-19 RX ORDER — POTASSIUM CHLORIDE 20 MEQ/1
20 TABLET, EXTENDED RELEASE ORAL 2 TIMES DAILY
Status: DISCONTINUED | OUTPATIENT
Start: 2023-12-19 | End: 2023-12-21

## 2023-12-19 RX ORDER — ALBUMIN, HUMAN INJ 5% 5 %
25 SOLUTION INTRAVENOUS ONCE
Status: COMPLETED | OUTPATIENT
Start: 2023-12-19 | End: 2023-12-19

## 2023-12-19 RX ADMIN — SODIUM CHLORIDE, SODIUM GLUCONATE, SODIUM ACETATE, POTASSIUM CHLORIDE AND MAGNESIUM CHLORIDE: 526; 502; 368; 37; 30 INJECTION, SOLUTION INTRAVENOUS at 00:15

## 2023-12-19 RX ADMIN — OXYCODONE HYDROCHLORIDE 10 MG: 10 TABLET ORAL at 22:55

## 2023-12-19 RX ADMIN — POTASSIUM CHLORIDE 20 MEQ: 1500 TABLET, EXTENDED RELEASE ORAL at 17:05

## 2023-12-19 RX ADMIN — ACETAMINOPHEN 1000 MG: 500 TABLET, FILM COATED ORAL at 00:26

## 2023-12-19 RX ADMIN — ACETAMINOPHEN 1000 MG: 500 TABLET, FILM COATED ORAL at 17:05

## 2023-12-19 RX ADMIN — METOPROLOL TARTRATE 12.5 MG: 25 TABLET, FILM COATED ORAL at 17:05

## 2023-12-19 RX ADMIN — ASPIRIN 81 MG: 81 TABLET, COATED ORAL at 05:42

## 2023-12-19 RX ADMIN — CLOPIDOGREL BISULFATE 75 MG: 75 TABLET ORAL at 05:42

## 2023-12-19 RX ADMIN — DOCUSATE SODIUM 50 MG AND SENNOSIDES 8.6 MG 2 TABLET: 8.6; 5 TABLET, FILM COATED ORAL at 05:42

## 2023-12-19 RX ADMIN — MAGNESIUM SULFATE HEPTAHYDRATE 1 G: 1 INJECTION, SOLUTION INTRAVENOUS at 05:32

## 2023-12-19 RX ADMIN — Medication 1 APPLICATOR: at 20:38

## 2023-12-19 RX ADMIN — ONDANSETRON 8 MG: 2 INJECTION INTRAMUSCULAR; INTRAVENOUS at 05:29

## 2023-12-19 RX ADMIN — OMEPRAZOLE 20 MG: 20 CAPSULE, DELAYED RELEASE ORAL at 05:42

## 2023-12-19 RX ADMIN — DOCUSATE SODIUM 50 MG AND SENNOSIDES 8.6 MG 2 TABLET: 8.6; 5 TABLET, FILM COATED ORAL at 17:06

## 2023-12-19 RX ADMIN — POLYETHYLENE GLYCOL 3350 1 PACKET: 17 POWDER, FOR SOLUTION ORAL at 05:42

## 2023-12-19 RX ADMIN — Medication 1 APPLICATOR: at 09:18

## 2023-12-19 RX ADMIN — OXYCODONE HYDROCHLORIDE 10 MG: 10 TABLET ORAL at 04:22

## 2023-12-19 RX ADMIN — ACETAMINOPHEN 1000 MG: 500 TABLET, FILM COATED ORAL at 12:12

## 2023-12-19 RX ADMIN — ATORVASTATIN CALCIUM 40 MG: 40 TABLET, FILM COATED ORAL at 20:38

## 2023-12-19 RX ADMIN — FUROSEMIDE 40 MG: 10 INJECTION, SOLUTION INTRAVENOUS at 17:05

## 2023-12-19 RX ADMIN — OXYCODONE HYDROCHLORIDE 5 MG: 5 TABLET ORAL at 13:24

## 2023-12-19 RX ADMIN — ALBUMIN (HUMAN) 25 G: 12.5 SOLUTION INTRAVENOUS at 12:28

## 2023-12-19 RX ADMIN — ACETAMINOPHEN 1000 MG: 500 TABLET, FILM COATED ORAL at 05:55

## 2023-12-19 RX ADMIN — ENOXAPARIN SODIUM 40 MG: 100 INJECTION SUBCUTANEOUS at 17:04

## 2023-12-19 ASSESSMENT — PAIN DESCRIPTION - PAIN TYPE
TYPE: ACUTE PAIN;SURGICAL PAIN
TYPE: ACUTE PAIN;SURGICAL PAIN
TYPE: ACUTE PAIN
TYPE: SURGICAL PAIN
TYPE: ACUTE PAIN;SURGICAL PAIN
TYPE: SURGICAL PAIN
TYPE: ACUTE PAIN;SURGICAL PAIN
TYPE: ACUTE PAIN;SURGICAL PAIN
TYPE: SURGICAL PAIN;ACUTE PAIN
TYPE: ACUTE PAIN;SURGICAL PAIN
TYPE: SURGICAL PAIN
TYPE: ACUTE PAIN;SURGICAL PAIN

## 2023-12-19 ASSESSMENT — ENCOUNTER SYMPTOMS
PALPITATIONS: 0
VOMITING: 1
SORE THROAT: 0
HEADACHES: 0
PND: 0
FOCAL WEAKNESS: 0
NERVOUS/ANXIOUS: 0
ABDOMINAL PAIN: 0
EYES NEGATIVE: 1
COUGH: 0
BRUISES/BLEEDS EASILY: 0
DIARRHEA: 0
FEVER: 0
NAUSEA: 1
SPUTUM PRODUCTION: 0
MUSCULOSKELETAL NEGATIVE: 1
CHILLS: 0
ORTHOPNEA: 0
SHORTNESS OF BREATH: 0

## 2023-12-19 ASSESSMENT — PATIENT HEALTH QUESTIONNAIRE - PHQ9
1. LITTLE INTEREST OR PLEASURE IN DOING THINGS: NOT AT ALL
SUM OF ALL RESPONSES TO PHQ9 QUESTIONS 1 AND 2: 0
2. FEELING DOWN, DEPRESSED, IRRITABLE, OR HOPELESS: NOT AT ALL
SUM OF ALL RESPONSES TO PHQ9 QUESTIONS 1 AND 2: 0
2. FEELING DOWN, DEPRESSED, IRRITABLE, OR HOPELESS: NOT AT ALL

## 2023-12-19 ASSESSMENT — FIBROSIS 4 INDEX: FIB4 SCORE: 1.03

## 2023-12-19 NOTE — THERAPY
Physical Therapy Contact Note    Patient Name: Pa Sinclair  Age:  68 y.o., Sex:  male  Medical Record #: [id number]  Today's Date: 12/19/2023 12/19/23 0806   Interdisciplinary Plan of Care Collaboration   Collaboration Comments PT orders received, pt is POD #1 CORONARY ARTERY BYPASS GRAFT X 3. PT will attempt on POD #2 as medically appropriate.

## 2023-12-19 NOTE — CARE PLAN
The patient is Watcher - Medium risk of patient condition declining or worsening    Shift Goals  Clinical Goals: Hemodynamic stability, EOB, pain control.  Patient Goals: Pain control, sleep.    Progress made toward(s) clinical / shift goals:  Administering second liter of prn plasmalyte to maintain SBP goal of  mmHg. Restarted low dose norepinephrine. Titrated pt's O2 down to 1L NC, pt satting in the high 90%'s. Pt was given majority of night to rest/ sleep.    Patient is progressing towards the following goals:    Problem: Pain - Standard  Goal: Alleviation of pain or a reduction in pain to the patient’s comfort goal  Outcome: Progressing  Note: PRN Oxycodone 5-10 mg administered for pain when pt is having bilateral, posterior shoulder pain and mid-sternal surgical pain. Pt tolerating well, reports relief.     Problem: Day of surgery post CABG/Heart valve replacement  Goal: Stabilization in immediate post op period  Outcome: Progressing  Intervention: If radial artery used, elevate arm, no BP checks or needle sticks from affected arm, monitor ulnar pulse and capillary refill  Note: Radial art line dampened despite multiple attempts at troubleshooting. Removed.   Intervention: Dangle within 4 hours post extubation  Note: Pt dangled at EOB within 4 hours of extubation. Pt tolerated well.   Intervention: Up in chair 4 hours, day of extubation  Note: N/A.

## 2023-12-19 NOTE — RESPIRATORY CARE
Extubation    Cuff leak noted: yes  Stridor present: no     O2 (LPM): 4 (12/18/23 1643)     Patient toleration: tolerated well    Events/Summary/Plan: Pt extubated to 4L NC (12/18/23 1643)

## 2023-12-19 NOTE — FLOWSHEET NOTE
12/18/23 1641   Weaning Parameters   RR (bpm) 20   $ FVC / Vital Capacity (liters)  1.8   NIF (cm H2O)  -25   Rapid Shallow Breathing Index (RR/VT) 65   Spontaneous VE 13.8   Spontaneous

## 2023-12-19 NOTE — PROGRESS NOTES
Critical Care Progress Note    Date of admission  12/18/2023    Chief Complaint  68 y.o. male w/PMHx CAD, DLD and hypertension who presented 12/18/2023 for elective CABG three-vessel.  Patient's history notable for exertional chest pain and abnormal cardiac catheterization significant for multivessel disease.  Case reviewed at the bedside with cardiac surgery and cardiac anesthesia postop.  Patient did well but venous grafts were unusable and he had bilateral mammary artery bypass with 3 vessels.  Hemodynamics acceptable postop.  Coming off pump RV and LV function was normal.  Pacer wires in place and functional but has not required pacing.  Sedate on dexmedetomidine coming to the unit.  On insulin at 6 units/h for hyperglycemia.  Alternating between low-dose clevidipine and low-dose norepinephrine for labile BP.  Urine output adequate.  Has not required any blood product infusion and received 350 of Cell Saver.  ([facility] HPI 12/18)       Hospital Course  No notes on file    Interval Problem Update  Reviewed last 24 hour events:    POD#1 CABG 3V-2 mammaries, and lung resection    Extubated yesterday  A&O x 4  Denies shortness of breath, some incisional chest pain  SB/SR 50-60s  SBP 80-90s  Norepinephrine 0.03  UO adequate, fluid balance +1.763 L  1 L nasal cannula  O2 sats 94-97%  WOB low  CXR: Extubated, smallish volumes, atelectasis/edema, no PTX  CT no air leak  I-S 1750  Tmax 99.0, WBC 9.6  Hemoglobin 12.2,   , K4.4, HCO3 22, AG 11  BUN 20, creatinine 0.7  Glucose 131, 110, 118, 118, 132, 133, 119  Insulin infusion discontinued, glucose levels controlled    Case reviewed with CVS at the bedside    Failed weaning off NE, dropped SBp to 70s back on NE 0.02  ALB trial    Review of Systems  Review of Systems   Constitutional:  Negative for chills, fever and malaise/fatigue.   HENT:  Negative for congestion and sore throat.    Eyes: Negative.    Respiratory:  Negative for cough, sputum production and  shortness of breath.    Cardiovascular:  Positive for chest pain (Incisional). Negative for palpitations, orthopnea and PND.   Gastrointestinal:  Positive for nausea and vomiting. Negative for abdominal pain and diarrhea.   Genitourinary: Negative.         Has Sauer catheter   Musculoskeletal: Negative.    Neurological:  Negative for focal weakness and headaches.   Endo/Heme/Allergies:  Does not bruise/bleed easily.   Psychiatric/Behavioral:  The patient is not nervous/anxious.         Vital Signs for last 24 hours   Temp:  [36.3 °C (97.3 °F)-37.2 °C (99 °F)] 37.2 °C (99 °F)  Pulse:  [50-72] 69  Resp:  [9-28] 25  BP: ()/(48-80) 93/62  SpO2:  [89 %-99 %] 97 %    Hemodynamic parameters for last 24 hours       Respiratory Information for the last 24 hours  Vent Mode: Spont  PEEP/CPAP: 8  P Support: 5  MAP: 11  Control VTE (exp VT): 594    Physical Exam   Physical Exam  Vitals reviewed.   Constitutional:       Appearance: He is normal weight. He is not ill-appearing or diaphoretic.      Interventions: Nasal cannula in place.   HENT:      Head: Normocephalic and atraumatic.      Mouth/Throat:      Mouth: Mucous membranes are moist.   Eyes:      General: No scleral icterus.     Extraocular Movements: Extraocular movements intact.      Pupils: Pupils are equal, round, and reactive to light.   Neck:      Vascular: No JVD.      Comments: R IJ CVC  Cardiovascular:      Rate and Rhythm: Normal rate and regular rhythm.      Pulses: Normal pulses.      Heart sounds: No murmur heard.     No gallop.   Pulmonary:      Effort: Pulmonary effort is normal. No respiratory distress.      Breath sounds: No wheezing, rhonchi or rales.   Chest:      Comments: V wires and mediastinal chest tubes  Abdominal:      General: Abdomen is flat. Bowel sounds are normal. There is no distension.      Palpations: Abdomen is soft. There is no mass.      Tenderness: There is no abdominal tenderness. There is no right CVA tenderness, left CVA  tenderness, guarding or rebound.      Hernia: No hernia is present.   Genitourinary:     Comments: Sauer  Musculoskeletal:      Cervical back: Neck supple.      Right lower leg: No edema.      Left lower leg: No edema.      Comments: Left lower extremity surgical dressing, venous harvest site   Lymphadenopathy:      Cervical: No cervical adenopathy.   Skin:     General: Skin is warm and dry.      Capillary Refill: Capillary refill takes less than 2 seconds.   Neurological:      General: No focal deficit present.      Mental Status: He is alert and oriented to person, place, and time. Mental status is at baseline.   Psychiatric:         Mood and Affect: Mood normal.         Behavior: Behavior normal. Behavior is cooperative.         Thought Content: Thought content normal.         Medications  Current Facility-Administered Medications   Medication Dose Route Frequency Provider Last Rate Last Admin    furosemide (Lasix) injection 40 mg  40 mg Intravenous BID DIURETIC MAR WhitlockPANDRE        potassium chloride SA (Kdur) tablet 20 mEq  20 mEq Oral BID MAR WhitlockPANDRE        Respiratory Therapy Consult   Nebulization Continuous RT MAR EliPRajendraRANDRE        NS infusion   Intravenous Continuous MAR EliP.RRajendraN. 10 mL/hr at 12/18/23 2101 Associate Infusion Pump at 12/18/23 2101    NS infusion   Intravenous Continuous MAR EliP.R.N.   Stopped at 12/19/23 0413    electrolyte-A (Plasmalyte-A) infusion   Intravenous PRN MAR EliP.R.N.   Stopped at 12/19/23 0400    enoxaparin (Lovenox) inj 40 mg  40 mg Subcutaneous DAILY AT 1800 MAR EliPRajendraRANDRE        Nozin nasal  swab  1 Applicator Each Nostril BID MAR EliPRajendraR.N.   1 Applicator at 12/19/23 0918    calcium CHLORIDE 10 % 1,000 mg in dextrose 5% 100 mL IVPB  1,000 mg Intravenous Once PRN MAR EliP.RANDRE        magnesium sulfate in D5W IVPB premix 1 g  1 g Intravenous DAILY Jennifer RAZO  MAR BeltranPRajendraRRajendraN.   Stopped at 12/19/23 0632    K+ Scale: Goal of 4.5  1 Each Intravenous Q6HRS MAR EliPRajendraR.N.   1 Each at 12/18/23 1315    nitroglycerin 50 mg in D5W 250 ml infusion  0-100 mcg/min Intravenous Continuous TOI EliRANDRE   Dose not Required at 12/18/23 1330    Pharmacy Consult Request ...Pain Management Review 1 Each  1 Each Other PHARMACY TO DOSE MAR EliP.R.HUSEYIN.        acetaminophen (Tylenol) tablet 1,000 mg  1,000 mg Oral Q6HRS CHALO Eli.P.R.N.   1,000 mg at 12/19/23 0555    Followed by    [START ON 12/28/2023] acetaminophen (Tylenol) tablet 1,000 mg  1,000 mg Oral Q6HRS PRN MAR EliP.R.N.        oxyCODONE immediate-release (Roxicodone) tablet 5 mg  5 mg Oral Q3HRS PRN CHALO Eli.P.R.N.   5 mg at 12/18/23 1733    Or    oxyCODONE immediate release (Roxicodone) tablet 10 mg  10 mg Oral Q3HRS PRN CHALO Eli.P.R.N.   10 mg at 12/19/23 0422    Or    fentaNYL (Sublimaze) injection 50 mcg  50 mcg Intravenous Q3HRS PRN MAR EliP.R.N.   50 mcg at 12/18/23 1602    traMADol (Ultram) 50 MG tablet 50 mg  50 mg Oral Q4HRS PRN CHALO Eli.P.R.N.        midazolam (Versed) injection 2 mg  2 mg Intravenous Q HOUR PRN CHALO Eli.P.R.N.        sodium bicarbonate 8.4 % injection 50 mEq  50 mEq Intravenous Q HOUR PRN CHALO Eli.P.R.N.        morphine 4 MG/ML injection 4 mg  4 mg Intravenous Q HOUR PRN CHALO Eli.P.R.N.        ondansetron (Zofran) syringe/vial injection 8 mg  8 mg Intravenous Q6HRS PRN Jennifer Beltran, CHALO.P.R.N.   8 mg at 12/19/23 0529    Or    prochlorperazine (Compazine) injection 10 mg  10 mg Intravenous Q6HRS PRN Jennifer Beltran, A.P.R.N.        senna-docusate (Pericolace Or Senokot S) 8.6-50 MG per tablet 2 Tablet  2 Tablet Oral BID CHALO Eli.P.R.N.   2 Tablet at 12/19/23 0542    And    polyethylene glycol/lytes (Miralax) Packet 1 Packet  1 Packet Oral DAILY Jennifer RAZO  MAR BeltranP.R.N.   1 Packet at 12/19/23 0542    And    [START ON 12/20/2023] magnesium hydroxide (Milk Of Magnesia) suspension 30 mL  30 mL Oral DAILY CHALO Eli.P.R.N.        And    bisacodyl (Dulcolax) suppository 10 mg  10 mg Rectal QDAY PRN CHALO Eli.P.R.N.        omeprazole (PriLOSEC) capsule 20 mg  20 mg Oral DAILY CHALO Eli.P.R.N.   20 mg at 12/19/23 0542    mag hydrox-al hydrox-simeth (Maalox Plus Es Or Mylanta Ds) suspension 30 mL  30 mL Oral Q4HRS PRN CHALO Eli.P.R.N.        diphenhydrAMINE (Benadryl) tablet/capsule 25 mg  25 mg Oral HS PRN - MR X 1 MAR EliP.R.N.        metoprolol tartrate (Lopressor) tablet 12.5 mg  12.5 mg Oral BID CHALO Eli.P.R.N.        Followed by    [START ON 12/20/2023] metoprolol tartrate (Lopressor) tablet 25 mg  25 mg Oral BID CHALO Eli.P.R.N.        aspirin EC tablet 81 mg  81 mg Oral DAILY CHALO Eli.P.R.N.   81 mg at 12/19/23 0542    clopidogrel (Plavix) tablet 75 mg  75 mg Oral DAILY CHALO Eli.P.R.N.   75 mg at 12/19/23 0542    atorvastatin (Lipitor) tablet 40 mg  40 mg Oral QHS CHALO Eli.P.R.N.   40 mg at 12/18/23 2103    norepinephrine (Levophed) 8 mg in 250 mL NS infusion (premix)  0-1 mcg/kg/min (Ideal) Intravenous Continuous MAR WhitlockP.N. 4.2 mL/hr at 12/19/23 0556 0.03 mcg/kg/min at 12/19/23 0556    insulin regular (HumuLIN R,NovoLIN R) injection  0-14 Units Intravenous Once CHALO Whitlock.P.N.        insulin lispro (HumaLOG,AdmeLOG) injection  0-14 Units Subcutaneous TID AC LOPEZ Whitlock        insulin regular (Humulin R) 100 Units in  mL Infusion  0-29 Units/hr Intravenous Continuous LOPEZ Whitlock   Stopped. (Insulin or Heparin) at 12/18/23 1547    dextrose 10 % BOLUS 12.5-25 g  12.5-25 g Intravenous PRN LOPEZ Whitlock MD Alert...Pharmacy to initiate transition from insulin infusion to subcutaneous insulin for  cardiothoracic surgery 1 Each  1 Each Other Continuous Hermelinda Baker A.P.N.   Dose not Required at 12/18/23 1330    clevidipine (Cleviprex) IV emulsion  0-21 mg/hr Intravenous Continuous CHALO Whitlock.P.N.   Dose not Required at 12/18/23 1300    dexmedetomidine (PRECEDEX) 400 mcg/100mL NS premix infusion  0.1-1.5 mcg/kg/hr (Ideal) Intravenous Continuous CHALO Whitlock.P.N.   Stopped at 12/19/23 0320       Fluids    Intake/Output Summary (Last 24 hours) at 12/19/2023 1118  Last data filed at 12/19/2023 1000  Gross per 24 hour   Intake 4544.83 ml   Output 2622 ml   Net 1922.83 ml       Laboratory  Recent Labs     12/18/23  1308 12/18/23  1400 12/18/23  1511   ISTATAPH 7.297* 7.417 7.349*   ISTATAPCO2 47.3* 32.2 37.1*   ISTATAPO2 239* 144* 123*   ISTATATCO2 25 22 22   VQTCDLG0YRG 100* 99 99   ISTATARTHCO3 23.1 20.7 20.4   ISTATARTBE -4 -3 -5*   ISTATTEMP 36.4 C 36.3 C 36.5 C   ISTATFIO2 100 70 50   ISTATSPEC Arterial Arterial Arterial   ISTATAPHTC 7.306* 7.427 7.356*   BEKYXYTU4GV 236* 139* 120*         Recent Labs     12/18/23  1305 12/18/23  1723 12/18/23  2315 12/19/23  0140 12/19/23  0510   SODIUM  --   --   --  139  --    POTASSIUM 3.9   < > 4.6 4.4 4.9   CHLORIDE  --   --   --  106  --    CO2  --   --   --  22  --    BUN  --   --   --  20  --    CREATININE  --   --   --  0.70  --    MAGNESIUM 2.9*  --   --   --   --    CALCIUM  --   --   --  7.9*  --     < > = values in this interval not displayed.     Recent Labs     12/19/23  0140   GLUCOSE 122*     Recent Labs     12/19/23  0140   WBC 9.6     Recent Labs     12/18/23  1305 12/19/23  0140   RBC  --  3.73*   HEMOGLOBIN 14.1 12.2*   HEMATOCRIT 42.3 36.5*   PLATELETCT 194 196   PROTHROMBTM 16.6*  --    APTT 30.9  --    INR 1.33*  --        Imaging  X-Ray:  I have personally reviewed the images and compared with prior images.  EKG:  I have personally reviewed the images and compared with prior images.  CT:    Reviewed  Echo:    Reviewed    Assessment/Plan  * S/P CABG x 3  Assessment & Plan  S/p bilateral mammary artery bypass to 3 vessels 12/19, see operative note  Patient also had partial lung resection see operative note  Dr. Bearden  Extubated 12/19  Titrating norepinephrine for hypotension-vasoplegia  Clevidipine weaned off  V wires present, has not required pacing-capping  Mediastinal chest tubes present, still no air leak and minimal output  No PTX on chest x-ray post lung resection  Plasma-Lyte fluid bolus as needed for relative hypovolemia versus IV albumin  Note CVC is in IJ transitioning out into subclavian  Forced diuresis as needed  Transition to beta-blocker/ACE when hemodynamics allow  DAPT  Lovenox PPx when okay with CVS  PPI PPx    Encounter for weaning from ventilator (HCC)  Assessment & Plan  Debated electively 12/18 by cardiac anesthesia for CABG  RT protocols  Extubated less than 6 hours postop  Incentive spirometry  Mobilize  Monitor for need for forced diuresis with Lasix  Follow-up chest x-ray if remains on oxygen    Hyperglycemia  Assessment & Plan  Transitioned off insulin infusion early due to normal glycemia  SSI as needed, so far not requiring it, likely DC that in the next 24 hours  Hypoglycemia protocols  No Hx DM  A1c 5.7    Primary hypertension- (present on admission)  Assessment & Plan  Resume home regimen as clinically appropriate  Ideally ACE/BB post op  Patient with vasoplegia postop, still requiring norepinephrine infusion for hypotension    CAD (coronary artery disease)- (present on admission)  Assessment & Plan  History of multivessel disease by heart catheterization  Preoperative evaluation included:    ECHOCARDIOGRAM 11/20/23 [facility]:  1. The left ventricle is normal in size and systolic function with an ejection fraction of 55-60% by Santa's biplane with no wall motion abnormalities noted. There is grade 1 (impaired relaxation) diastolic function with normal LV filling pressures.  2. The right  atrium size is mildly dilated  3. Mild aortic regurgitation  4. Mild mitral regurgitation  5. The ascending aorta measures 4.0 cm.  6. The right ventricle is normal in size and function; RVSP is normal at 33 mmHg with a RAP of 8 mmHg.     CARDIAC CATHETERIZATION 11/20/23 [facility] [place]  LEFT VENTRICLE: LVEDP was 16 mmHg, no significant gradient across the aortic valve on pullback.  LEFT MAIN: Large-caliber vessel bifurcating into an LAD and left circumflex without angiographically significant atherosclerotic disease  LAD: Large-caliber, heavily calcified vessel, the LAD has severe disease and  in the proximal segment after the take off of a diagonal. The LAD and the second diagonal fills via a collateral from the proximal RCA. The first diagonal is a medium sized vessel with 60-70% stenosis proximally.  LCX: Large-caliber vessel giving off 2 large OM branches and terminates in the AV groove. The Ostial and proximal LCX has 2 tandem 85-90% stenosis. The lesion is just before take off of an OM1. The OM2 has mild to moderate disease in the proximal segment.  RCA: The RCA is  in the proximal segment but supplies collateral to the LAD. The distal RCA fills via collaterals from the AV groove LCX.    Final impressions:  - Severe calcified 3 vessel disease involving  of the proximal LAD,  of the proximal RCA and Severe disease of the LCX  - Collaterals to the LAD from the right system and Collaterals to the RCA from the AV groove LCX     Risk factor modification long-term    High cholesterol- (present on admission)  Assessment & Plan  Statin         VTE:  Lovenox  Ulcer: PPI  Lines: Central Line  Ongoing indication addressed, Arterial Line  Ongoing indication addressed, and Sauer Catheter  Ongoing indication addressed    I have performed a physical exam and reviewed and updated ROS and Plan today (12/19/2023). In review of yesterday's note (12/18/2023), there are no changes except as documented above.     Discussed  patient condition and risk of morbidity and/or mortality with Family, RN, RT, Pharmacy, Charge nurse / hot rounds, Patient, and CVS  The patient remains critically ill, actively titrating norepinephrine.  Critical care time = 40 minutes in directly providing and coordinating critical care and extensive data review.  No time overlap and excludes procedures.

## 2023-12-19 NOTE — PROGRESS NOTES
Cardiovascular Surgery Progress Note    Name: Pa Sinclair  MRN: [id number]  : 1955  Admit Date: 2023  5:23 AM  1 Day Post-Op     Procedure:  Procedure(s) and Anesthesia Type:     * CORONARY ARTERY BYPASS GRAFT X 3, ENDOSCOPIC VEIN HARVEST - General     * ECHOCARDIOGRAM, TRANSESOPHAGEAL, INTRAOPERATIVE - General     * LEFT UPPER LOBE WEDGE RESECTION - General    Vitals:  Vitals:    23 0545 23 0600 23 0615 23 0645   BP: (!) 84/54 (!) 89/58     Pulse: (!) 53 (!) 55 (!) 55 68   Resp:       Temp:  37.2 °C (99 °F)     TempSrc:  Bladder     SpO2: 94% 94% 95% 96%   Weight:       Height:          Temp (24hrs), Av.6 °C (97.8 °F), Min:36.3 °C (97.3 °F), Max:37.2 °C (99 °F)      Respiratory:  Vent Mode: Spont, PEEP/CPAP: 8, PIP: 21, MAP: 11 Respiration: (!) 25, Pulse Oximetry: 96 %       Fluids:    Intake/Output Summary (Last 24 hours) at 2023 0740  Last data filed at 2023 0632  Gross per 24 hour   Intake 4304.83 ml   Output 2542 ml   Net 1762.83 ml     Admit weight: Weight: 88.9 kg (195 lb 15.8 oz)  Current weight: Weight: 87.5 kg (192 lb 14.4 oz) (23 0513)    Labs:  Recent Labs     23  1305 23  0140   WBC  --  9.6   RBC  --  3.73*   HEMOGLOBIN 14.1 12.2*   HEMATOCRIT 42.3 36.5*   MCV  --  97.9*   MCH  --  32.7   MCHC  --  33.4   RDW  --  44.3   PLATELETCT 194 196   MPV  --  10.5     Recent Labs     23  2315 23  0140 23  0510   SODIUM  --  139  --    POTASSIUM 4.6 4.4 4.9   CHLORIDE  --  106  --    CO2  --  22  --    GLUCOSE  --  122*  --    BUN  --  20  --    CREATININE  --  0.70  --    CALCIUM  --  7.9*  --      Recent Labs     23  1305   APTT 30.9   INR 1.33*       HgbA1c:  5.7    Diabetic Educator Consult:  N\A    Medications:  Scheduled Medications   Medication Dose Frequency    enoxaparin (LOVENOX) injection  40 mg DAILY AT 1800    Nozin nasal  swab  1 Applicator BID    magnesium sulfate  1 g DAILY    K+ Scale:  Goal of 4.5  1 Each Q6HRS    Pharmacy Consult Request  1 Each PHARMACY TO DOSE    acetaminophen  1,000 mg Q6HRS    senna-docusate  2 Tablet BID    And    polyethylene glycol/lytes  1 Packet DAILY    And    [START ON 12/20/2023] magnesium hydroxide  30 mL DAILY    omeprazole  20 mg DAILY    metoprolol tartrate  12.5 mg BID    Followed by    [START ON 12/20/2023] metoprolol tartrate  25 mg BID    aspirin  81 mg DAILY    clopidogrel  75 mg DAILY    atorvastatin  40 mg QHS    insulin regular  0-14 Units Once    insulin lispro  0-14 Units TID AC        Exam:   Physical Exam  Constitutional:       General: He is not in acute distress.     Appearance: He is well-developed. He is not diaphoretic.   Cardiovascular:      Rate and Rhythm: Normal rate and regular rhythm.      Heart sounds: Normal heart sounds. No murmur heard.     No friction rub. No gallop.   Pulmonary:      Effort: Pulmonary effort is normal. No respiratory distress.      Breath sounds: Examination of the right-lower field reveals decreased breath sounds. Examination of the left-lower field reveals decreased breath sounds. Decreased breath sounds present. No wheezing or rales.   Abdominal:      General: There is no distension.      Palpations: Abdomen is soft.      Tenderness: There is no abdominal tenderness.   Musculoskeletal:      Cervical back: Neck supple.   Skin:     General: Skin is warm and dry.      Comments: Sternum- covered  SVG site- Ace wrap   Neurological:      Mental Status: He is alert and oriented to person, place, and time. Mental status is at baseline.         Cardiac Medications:    ASA - Yes    Plavix - Yes    Post-operative Beta Blockers - Yes    Ace/ARB- No; contraindicated because of Normal EF    Statin - Yes    Aldactone- No; contraindicated because of Normal EF    SGLT2i-  No contraindicated because of No; contraindicated because of Normal EF    Ejection Fraction:  50%    Telemetry:   12/19 SR    Assessment/Plan:  POD 1 HOTN- on low  dose levo, SR, d/c mediastinal tubes, diurese when able, enc IS/ambulation    Disposition:  TBD

## 2023-12-19 NOTE — PROGRESS NOTES
Monitor Summary:    Sinus Mack/ Sinus Rhythm: 50's-60's. Rare PAC's.     First degree heart block.    .21/.09/.48

## 2023-12-19 NOTE — ANESTHESIA POSTPROCEDURE EVALUATION
Patient: Pa Sinclair    Procedure Summary       Date: 12/18/23 Room / Location: [facility] 02 / SURGERY [facility]    Anesthesia Start: 0730 Anesthesia Stop: 1310    Procedures:       CORONARY ARTERY BYPASS GRAFT X 3, ENDOSCOPIC VEIN HARVEST (Chest)      ECHOCARDIOGRAM, TRANSESOPHAGEAL, INTRAOPERATIVE (Mouth)      LEFT UPPER LOBE WEDGE RESECTION (Chest) Diagnosis: (CORONARY ARTERY DISEASE)    Surgeons: Josh Bearden D.O. Responsible Provider: Gagandeep Hickman M.D.    Anesthesia Type: general ASA Status: 4            Final Anesthesia Type: general  Last vitals  BP   Blood Pressure : 109/61, Arterial BP: (!) 97/66    Temp   37.2 °C (99 °F)    Pulse   70   Resp   (!) 25    SpO2   90 %      Anesthesia Post Evaluation    Patient location during evaluation: PACU  Patient participation: complete - patient participated  Level of consciousness: awake and alert    Airway patency: patent  Anesthetic complications: no  Cardiovascular status: hemodynamically stable  Respiratory status: face mask    PONV: none          No notable events documented.     Nurse Pain Score: 1 (NPRS)

## 2023-12-19 NOTE — ASSESSMENT & PLAN NOTE
Debated electively 12/18 by cardiac anesthesia for CABG  RT protocols  Extubated less than 6 hours postop  Incentive spirometry-volumes excellent-encouraged  Starting to mobilize  Forced diuresis with Lasix  Follow-up chest x-ray if remains on oxygen tomorrow  
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 The left ventricle is normal in size and systolic function with an ejection fraction of 55-60% by Santa's biplane with no wall motion abnormalities noted. There is grade 1 (impaired relaxation) diastolic function with normal LV filling pressures.  2. The right atrium size is mildly dilated  3. Mild aortic regurgitation  4. Mild mitral regurgitation  5. The ascending aorta measures 4.0 cm.  6. The right ventricle is normal in size and function; RVSP is normal at 33 mmHg with a RAP of 8 mmHg.     [place] [facility] [place]  LEFT VENTRICLE: LVEDP was 16 mmHg, no significant gradient across the aortic valve on pullback.  LEFT MAIN: Large-caliber vessel bifurcating into an LAD and left circumflex without angiographically significant atherosclerotic disease  LAD: Large-caliber, heavily calcified vessel, the LAD has severe disease and  in the proximal segment after the take off of a diagonal. The LAD and the second diagonal fills via a collateral from the proximal RCA. The first diagonal is a medium sized vessel with 60-70% stenosis proximally.  LCX: Large-caliber vessel giving off 2 large OM branches and terminates in the AV groove. The Ostial and proximal LCX has 2 tandem 85-90% stenosis. The lesion is just before take off of an OM1. The OM2 has mild to moderate disease in the proximal segment.  RCA: The RCA is  in the proximal segment but supplies collateral to the LAD. The distal RCA fills via collaterals from the AV groove LCX.    Final impressions:  - Severe calcified 3 vessel disease involving  of the proximal LAD,  of the proximal RCA and Severe disease of the LCX  - Collaterals to the LAD from the right system and Collaterals to the RCA from the AV groove LCX     Risk factor modification long-term-reviewed with patient at length  
Resume home regimen as clinically appropriate  Ideally ACE/BB post op  Vasoplegia improved, weaned off norepinephrine but blood pressure a bit soft  Hopefully start metoprolol today as hemodynamics allow  
S/p bilateral mammary artery bypass to 3 vessels 12/19, see operative note  Patient also had partial lung resection see operative note  Dr. Bearden  Extubated 12/19  Titrating norepinephrine for hypotension-vasoplegia improved, weaned off norepinephrine 12/19  Clevidipine weaned off  V wires present, has not required pacing-capped  Mediastinal chest tubes removed  No PTX on chest x-ray post lung resection  Plasma-Lyte fluid bolus as needed for relative hypovolemia versus IV albumin  Note CVC is in IJ transitioning out into subclavian  Forced diuresis for mild pulmonary edema  Transition to beta-blocker/ACE when hemodynamics allow-hopefully start metoprolol today  DAPT  Lovenox PPx when okay with CVS  PPI PPx  Mobilize  Cardiac rehab-risk factor modification  
Statin  Dietary education long-term  
Transitioned off insulin infusion early due to normal glycemia  Has not required SSI, DC insulin protocols  Hypoglycemia protocols  No Hx DM  A1c 5.7  
complains of pain/discomfort

## 2023-12-19 NOTE — PROGRESS NOTES
Monitor Summary  Rhythm: SR/SB with 1AVB  HR: 55- 65  Ectopy: n/a  Measurements: 0.22/0.12/0.47      Pacer settings: VVI  Backup rate: 45  Output: 10  Sensitivity: 2    No pacing required throughout shift.     12 hour chart check

## 2023-12-19 NOTE — PROGRESS NOTES
Late entry    Pt urine output this morning was 30 ml every 2 hours, APRN adrian notified, Albumin ordered. This afternoon pt urine output has increased to about 100 ml every two hours.

## 2023-12-19 NOTE — CARE PLAN
The patient is Watcher - Medium risk of patient condition declining or worsening    Shift Goals  Clinical Goals: wean off levo  Patient Goals: less n/v  Family Goals: emotional support, POC    Progress made toward(s) clinical / shift goals:    Problem: Pain - Standard  Goal: Alleviation of pain or a reduction in pain to the patient’s comfort goal  Description: Target End Date:  Prior to discharge or change in level of care    Document on Vitals flowsheet    1.  Document pain using the appropriate pain scale per order or unit policy  2.  Educate and implement non-pharmacologic comfort measures (i.e. relaxation, distraction, massage, cold/heat therapy, etc.)  3.  Pain management medications as ordered  4.  Reassess pain after pain med administration per policy  5.  If opiods administered assess patient's response to pain medication is appropriate per POSS sedation scale  6.  Follow pain management plan developed in collaboration with patient and interdisciplinary team (including palliative care or pain specialists if applicable)  Outcome: Progressing  Flowsheets  Taken 12/19/2023 1000 by Lindsay Ruffin R.N.  Pain Rating Scale (NPRS): 0  Taken 12/19/2023 0300 by Marcelo Patterson R.N.  Non Verbal Scale:   Calm   Sleeping   Unlabored Breathing  Taken 12/18/2023 1804 by Gina Gan R.N.  OB Pain Intervention:   Medication - See MAR   Repositioned   Rest   Relaxation technique  Taken 12/18/2023 1630 by Gina Gan R.N.  Critical-Care Pain Observation Score: 0     Problem: Post Op Day 1 CABG/Heart Valve Replacement  Goal: Optimal care of the post op CABG/heart valve replacement Post Op Day 1  Outcome: Progressing  Intervention: Ambulate in am if stable. First ambulation 25 feet. Repeat x 3 as tolerated  Note: Pt able to ambulate 50 feet x1   Intervention: Discontinue faustin catheter unless documented reason for continuation  Note: Faustin still intact due to diuresis   Intervention: Assess surgical dressing and  check provider orders for potential removal  Note: All surgical dressing removed, all sites clean dry and intact   Intervention: [facility] trained RN to remove chest tubes if ordered by provider  Note: Billy BURROWS removed chest tubes   Intervention: IS q 1 hour while awake and record best IS volume  Note: IS effective and strong efffort 1500  IS BL 4000       Patient is not progressing towards the following goals:

## 2023-12-19 NOTE — CARE PLAN
The patient is Watcher - Medium risk of patient condition declining or worsening    Shift Goals  Clinical Goals: hemodynamic stability, wean vasopressors, extubate, mobilize  Patient Goals: MARGIE  Family Goals: emotional support, POC    Progress made toward(s) clinical / shift goals:   Problem: Pain - Standard  Goal: Alleviation of pain or a reduction in pain to the patient’s comfort goal  Outcome: Progressing  Flowsheets  Taken 12/18/2023 1804  OB Pain Intervention:   Medication - See MAR   Repositioned   Rest   Relaxation technique  Taken 12/18/2023 1722  Pain Rating Scale (NPRS): 7  Note: Managing surgical sternal pain and upper back/shoulder pain with oxy and tylenol.     Problem: Day of surgery post CABG/Heart valve replacement  Goal: Stabilization in immediate post op period  Outcome: Progressing  Intervention: VS q 15 min x 4 hours, then q 1 hour. Include temperature immediately upon arrival. Check CO/CI q 2-4 hours and PRN  Note: Vitals completed per protocol. Tep 36.3 on arrival no piter hugger needed. No swan in place.   Intervention: First post op hour labs and EKG per order  Note: Initial labs and EKG obtained on arrival.   Intervention: Serum K q 6 hours x 24 hours.  ABG and CBC prn.  Note: K checked and replaced per protocol.   Intervention: Initiate post cardiac insulin infusion protocol orders for FSBS greater than 140 and check frequency per protocol  Note: Insulin gtt started and stopped for goal range 140-180.   Intervention: FSBS frequency as per Cardiac Surgery Insulin Drip Protocol  Note: FSBG q1hr per protocol, within parameters.   Intervention: For patients on Beta Blockers: verify dose given prior to surgery or within 6 hours after arrival to the unit  Note: BB dose administered prior to surgery.   Intervention: Chest tube to 20 cm suction, record CT drainage with VS, and check for air leak  Note: Chest tubes to -20 cm suction, no air leak. Total output this shift 220ml.  Intervention: For CT  drainage >300 mL in first hour post op and/or 150 mL in subsequent hours: Stat platelets, PT, INR, TEG, iSTAT, and H&H per order  Note: Minimal chest tube output, no stat labs or further interventions needed.   Intervention: Titrate and wean off vasoactive drips per patient's condition and per MD order while maintaining SBP  mmHg per MD order  Note: Weaned off Levo with SBP goal .   Intervention: VAP protocol in place  Note: Oral care completed, HOB elevated.   Intervention: Wean from Vent per protocol (see protocol), extubation goal within 6 hours post op  Note: Extubated at 1643 to NC. Total intubation time 3 hrs 43min.   Intervention: IS q 1 hour while awake post extubation  Note: IS deferred post extubation r/t nausea.  Baseline IS: 4000  Intervention: Bedrest until extubated and groin lines out  Note: N/a   Intervention: Maintain all original surgical dressings per provider orders and specifications  Note: All surgical dressing sites C/D/I.   Intervention: Clear liquids post extubation, order carbohydrate free (post cardiac surgery) diet, advance as tolerated  Note: Tolerating ice chips and water post extubation.   Intervention: Discontinue [place] yaw and arterial line 12-18 hours post op if hemodynamically stable and off vasoactive drips  Note: No swan yaw in place.  Intervention: A-Fib and DVT prophylaxis per MD order or contraindications documented (refer to DVT/VTE problem on Care Plan)  Note: Electrolytes replaced per protocol and SCDs applied.   Intervention: Amiodarone protocol per MD order  Note: No amio needed.      Problem: Fall Risk  Goal: Patient will remain free from falls  Outcome: Progressing     Problem: Knowledge Deficit - Standard  Goal: Patient and family/care givers will demonstrate understanding of plan of care, disease process/condition, diagnostic tests and medications  Outcome: Progressing

## 2023-12-19 NOTE — PROGRESS NOTES
Patient is waking up, following commands in all extremities. Nods head appropriately to questions, patient experiencing some pain. Patient repositioned, resting comfortably at this time.

## 2023-12-19 NOTE — PROGRESS NOTES
Chest tubes removed by [facility] RN.  Order confirmed by Hermelinda KENDRICK.  Chest tubes removed without complications.  No leak noted on container prior to removal.  Pt ambulated 10 minutes before removal. Pt and bedside RN educated about site care post removal.

## 2023-12-19 NOTE — PROGRESS NOTES
Patient breathing 100% spontaneous on vent. Following all commands.   pH: 7.34  PaCO2: 34  Pinsp: 5  RR: 20  Vt: 1.8  NIF: -25  PEEP: 8  O2 sats: 97% on 30% FiO2    Patient is hemodynamically stable on levo at 0.02 cg/kg/in.     RN and RT agree.     Pt extubated to 4L via NC @1643, no stridor noted.

## 2023-12-20 ENCOUNTER — PATIENT OUTREACH (OUTPATIENT)
Dept: SCHEDULING | Facility: IMAGING CENTER | Age: 68
End: 2023-12-20
Payer: MEDICARE

## 2023-12-20 LAB
ANION GAP SERPL CALC-SCNC: 6 MMOL/L (ref 7–16)
BUN SERPL-MCNC: 22 MG/DL (ref 8–22)
CALCIUM SERPL-MCNC: 8.1 MG/DL (ref 8.5–10.5)
CHLORIDE SERPL-SCNC: 102 MMOL/L (ref 96–112)
CO2 SERPL-SCNC: 27 MMOL/L (ref 20–33)
CREAT SERPL-MCNC: 0.95 MG/DL (ref 0.5–1.4)
ERYTHROCYTE [DISTWIDTH] IN BLOOD BY AUTOMATED COUNT: 44.3 FL (ref 35.9–50)
GFR SERPLBLD CREATININE-BSD FMLA CKD-EPI: 87 ML/MIN/1.73 M 2
GLUCOSE SERPL-MCNC: 117 MG/DL (ref 65–99)
HCT VFR BLD AUTO: 32 % (ref 42–52)
HGB BLD-MCNC: 10.6 G/DL (ref 14–18)
MCH RBC QN AUTO: 32.2 PG (ref 27–33)
MCHC RBC AUTO-ENTMCNC: 33.1 G/DL (ref 32.3–36.5)
MCV RBC AUTO: 97.3 FL (ref 81.4–97.8)
PLATELET # BLD AUTO: 167 K/UL (ref 164–446)
PMV BLD AUTO: 10.5 FL (ref 9–12.9)
POTASSIUM SERPL-SCNC: 4.3 MMOL/L (ref 3.6–5.5)
RBC # BLD AUTO: 3.29 M/UL (ref 4.7–6.1)
SODIUM SERPL-SCNC: 135 MMOL/L (ref 135–145)
WBC # BLD AUTO: 8.7 K/UL (ref 4.8–10.8)

## 2023-12-20 PROCEDURE — 700102 HCHG RX REV CODE 250 W/ 637 OVERRIDE(OP): Performed by: NURSE PRACTITIONER

## 2023-12-20 PROCEDURE — A9270 NON-COVERED ITEM OR SERVICE: HCPCS | Performed by: NURSE PRACTITIONER

## 2023-12-20 PROCEDURE — 97165 OT EVAL LOW COMPLEX 30 MIN: CPT

## 2023-12-20 PROCEDURE — 99024 POSTOP FOLLOW-UP VISIT: CPT | Performed by: NURSE PRACTITIONER

## 2023-12-20 PROCEDURE — 770020 HCHG ROOM/CARE - TELE (206)

## 2023-12-20 PROCEDURE — 80048 BASIC METABOLIC PNL TOTAL CA: CPT

## 2023-12-20 PROCEDURE — 700111 HCHG RX REV CODE 636 W/ 250 OVERRIDE (IP): Mod: JZ | Performed by: NURSE PRACTITIONER

## 2023-12-20 PROCEDURE — 97535 SELF CARE MNGMENT TRAINING: CPT

## 2023-12-20 PROCEDURE — 85027 COMPLETE CBC AUTOMATED: CPT

## 2023-12-20 PROCEDURE — 97162 PT EVAL MOD COMPLEX 30 MIN: CPT

## 2023-12-20 PROCEDURE — 99233 SBSQ HOSP IP/OBS HIGH 50: CPT | Performed by: INTERNAL MEDICINE

## 2023-12-20 RX ADMIN — MAGNESIUM HYDROXIDE 30 ML: 1200 LIQUID ORAL at 13:48

## 2023-12-20 RX ADMIN — ACETAMINOPHEN 1000 MG: 500 TABLET, FILM COATED ORAL at 17:24

## 2023-12-20 RX ADMIN — ASPIRIN 81 MG: 81 TABLET, COATED ORAL at 05:46

## 2023-12-20 RX ADMIN — FUROSEMIDE 40 MG: 10 INJECTION, SOLUTION INTRAVENOUS at 05:45

## 2023-12-20 RX ADMIN — MAGNESIUM SULFATE HEPTAHYDRATE 1 G: 1 INJECTION, SOLUTION INTRAVENOUS at 05:48

## 2023-12-20 RX ADMIN — FUROSEMIDE 40 MG: 10 INJECTION, SOLUTION INTRAVENOUS at 16:23

## 2023-12-20 RX ADMIN — ACETAMINOPHEN 1000 MG: 500 TABLET, FILM COATED ORAL at 05:46

## 2023-12-20 RX ADMIN — METOPROLOL TARTRATE 25 MG: 25 TABLET, FILM COATED ORAL at 17:25

## 2023-12-20 RX ADMIN — OMEPRAZOLE 20 MG: 20 CAPSULE, DELAYED RELEASE ORAL at 05:46

## 2023-12-20 RX ADMIN — POTASSIUM CHLORIDE 20 MEQ: 1500 TABLET, EXTENDED RELEASE ORAL at 17:24

## 2023-12-20 RX ADMIN — DOCUSATE SODIUM 50 MG AND SENNOSIDES 8.6 MG 2 TABLET: 8.6; 5 TABLET, FILM COATED ORAL at 17:24

## 2023-12-20 RX ADMIN — ACETAMINOPHEN 1000 MG: 500 TABLET, FILM COATED ORAL at 00:35

## 2023-12-20 RX ADMIN — DIPHENHYDRAMINE HYDROCHLORIDE 25 MG: 25 TABLET ORAL at 01:09

## 2023-12-20 RX ADMIN — ENOXAPARIN SODIUM 40 MG: 100 INJECTION SUBCUTANEOUS at 17:24

## 2023-12-20 RX ADMIN — ATORVASTATIN CALCIUM 40 MG: 40 TABLET, FILM COATED ORAL at 20:55

## 2023-12-20 RX ADMIN — ACETAMINOPHEN 1000 MG: 500 TABLET, FILM COATED ORAL at 12:00

## 2023-12-20 RX ADMIN — Medication 1 APPLICATOR: at 08:25

## 2023-12-20 RX ADMIN — DOCUSATE SODIUM 50 MG AND SENNOSIDES 8.6 MG 2 TABLET: 8.6; 5 TABLET, FILM COATED ORAL at 05:46

## 2023-12-20 RX ADMIN — CLOPIDOGREL BISULFATE 75 MG: 75 TABLET ORAL at 05:46

## 2023-12-20 RX ADMIN — POTASSIUM CHLORIDE 20 MEQ: 1500 TABLET, EXTENDED RELEASE ORAL at 05:45

## 2023-12-20 RX ADMIN — Medication 1 APPLICATOR: at 21:21

## 2023-12-20 RX ADMIN — POLYETHYLENE GLYCOL 3350 1 PACKET: 17 POWDER, FOR SOLUTION ORAL at 05:47

## 2023-12-20 RX ADMIN — METOPROLOL TARTRATE 25 MG: 25 TABLET, FILM COATED ORAL at 05:46

## 2023-12-20 ASSESSMENT — ENCOUNTER SYMPTOMS
ORTHOPNEA: 0
SHORTNESS OF BREATH: 0
VOMITING: 1
NAUSEA: 0
DIARRHEA: 0
SORE THROAT: 0
COUGH: 0
NERVOUS/ANXIOUS: 0
MUSCULOSKELETAL NEGATIVE: 1
FOCAL WEAKNESS: 0
FEVER: 0
PND: 0
PALPITATIONS: 0
BRUISES/BLEEDS EASILY: 0
HEADACHES: 0
EYES NEGATIVE: 1
SPUTUM PRODUCTION: 0
CHILLS: 0

## 2023-12-20 ASSESSMENT — FIBROSIS 4 INDEX: FIB4 SCORE: 1.21

## 2023-12-20 ASSESSMENT — COGNITIVE AND FUNCTIONAL STATUS - GENERAL
DAILY ACTIVITIY SCORE: 24
MOVING TO AND FROM BED TO CHAIR: A LITTLE
SUGGESTED CMS G CODE MODIFIER MOBILITY: CJ
MOBILITY SCORE: 22
TURNING FROM BACK TO SIDE WHILE IN FLAT BAD: A LITTLE
SUGGESTED CMS G CODE MODIFIER DAILY ACTIVITY: CH

## 2023-12-20 ASSESSMENT — PAIN DESCRIPTION - PAIN TYPE
TYPE: ACUTE PAIN;SURGICAL PAIN
TYPE: SURGICAL PAIN
TYPE: ACUTE PAIN
TYPE: ACUTE PAIN
TYPE: SURGICAL PAIN
TYPE: SURGICAL PAIN
TYPE: ACUTE PAIN;SURGICAL PAIN
TYPE: SURGICAL PAIN
TYPE: SURGICAL PAIN

## 2023-12-20 ASSESSMENT — GAIT ASSESSMENTS
GAIT LEVEL OF ASSIST: STANDBY ASSIST
ASSISTIVE DEVICE: FRONT WHEEL WALKER
DISTANCE (FEET): 400

## 2023-12-20 ASSESSMENT — ACTIVITIES OF DAILY LIVING (ADL): TOILETING: INDEPENDENT

## 2023-12-20 NOTE — PROGRESS NOTES
Patient transferred from T629 to T814 by CCT with 3 bags of belongings including cell phone and .

## 2023-12-20 NOTE — DISCHARGE PLANNING
Case Management Discharge Planning    Admission Date: 12/18/2023  GMLOS: 5.8  ALOS: 2    6-Clicks ADL Score:    6-Clicks Mobility Score:        Anticipated Discharge Dispo: Discharge Disposition: Discharged to home/self care (01)  Per chart review pt resides in [place], [place].     Family support:   Name                     Relation             Phone   Martell Sinclair                     [phone]  Backmindy, Ambreen         Significant other      [phone]    Current Insurance on file: Medicare (A&B) and [employer].    DME Needed: No    Action(s) Taken: Updated Provider/Nurse on Discharge Plan    Cart review complete; Pt was discussed in IDT rounds today; Pt admitted for a CABG and is post-op day 2; Pt has orders to transfer to the floor; LSW will continue to follow to assist with any CM needs.     Escalations Completed: None    Medically Clear: No    Next Steps:  f/u with pt and medical team to discuss dc needs and barriers.     Barriers to Discharge: Medical clearance    Is the patient up for discharge tomorrow: No

## 2023-12-20 NOTE — CARE PLAN
The patient is Watcher - Medium risk of patient condition declining or worsening    Shift Goals  Clinical Goals: Maintain hemodynamic stability, manage pain, maintain comfort, ambulate  Patient Goals: Comfort, sleep  Family Goals: MARGIE    Progress made toward(s) clinical / shift goals:    Problem: Knowledge Deficit - Standard  Goal: Patient and family/care givers will demonstrate understanding of plan of care, disease process/condition, diagnostic tests and medications  Outcome: Progressing     Problem: Pain - Standard  Goal: Alleviation of pain or a reduction in pain to the patient’s comfort goal  Outcome: Progressing     Problem: Post Op Day 1 CABG/Heart Valve Replacement  Goal: Optimal care of the post op CABG/heart valve replacement Post Op Day 1  Outcome: Progressing  Intervention: Ambulate in am if stable. First ambulation 25 feet. Repeat x 3 as tolerated  Note: Pt ambulated x4, best distance 450'  Intervention: Transfer to tele status, begin VS q 4 hours  Note: Pt appropriate for transfer to tele status  Intervention: After 24th hour post-anesthesia end time, transition patient to Cardiac Surgery SQ Insulin Protocol  Note: Pt already on SQ insulin, non-diabetic. No coverage needed post-op  Intervention: If patient is CABG or on home beta-blocker, start/resume beta-blocker on POD 1 or POD 2 or document contraindication  Note: Pt receiving metoprolol PO       Patient is not progressing towards the following goals:

## 2023-12-20 NOTE — PROGRESS NOTES
4 Eyes Skin Assessment Completed by MARKOS Cooley and MARKOS Shabazz.    Head WDL  Ears WDL  Nose WDL  Mouth WDL  Neck WDL  Breast/Chest Incision  Shoulder Blades WDL  Spine WDL  (R) Arm/Elbow/Hand WDL  (L) Arm/Elbow/Hand WDL  Abdomen Incision  Groin WDL  Scrotum/Coccyx/Buttocks WDL  (R) Leg WDL  (L) Leg WDL  (R) Heel/Foot/Toe WDL  (L) Heel/Foot/Toe WDL          Devices In Places Tele Box, Blood Pressure Cuff, Pulse Ox, Pacer, and PAUL's      Interventions In Place Sacral Mepilex and Pillows    Possible Skin Injury No    Pictures Uploaded Into Epic N/A  Wound Consult Placed N/A  RN Wound Prevention Protocol Ordered No

## 2023-12-20 NOTE — PROGRESS NOTES
Cardiovascular Surgery Progress Note    Name: Pa Sinclair  MRN: [id number]  : 1955  Admit Date: 2023  5:23 AM  2 Days Post-Op     Procedure:  Procedure(s) and Anesthesia Type:     * CORONARY ARTERY BYPASS GRAFT X 3, ENDOSCOPIC VEIN HARVEST - General     * ECHOCARDIOGRAM, TRANSESOPHAGEAL, INTRAOPERATIVE - General     * LEFT UPPER LOBE WEDGE RESECTION - General    Vitals:  Vitals:    23 0400 23 0500 23 0546 23 0600   BP: 122/73 114/66 114/66 115/68   Pulse: 75 65 68 71   Resp: 16 18  (!) 22   Temp: 37.5 °C (99.5 °F)      TempSrc: Bladder      SpO2: 93% 94%  93%   Weight:    87.7 kg (193 lb 5.5 oz)   Height:          Temp (24hrs), Av.6 °C (99.7 °F), Min:37.5 °C (99.5 °F), Max:37.7 °C (99.9 °F)      Respiratory:    Respiration: (!) 22, Pulse Oximetry: 93 %       Fluids:    Intake/Output Summary (Last 24 hours) at 2023 0752  Last data filed at 2023 0600  Gross per 24 hour   Intake 1742.92 ml   Output 3120 ml   Net -1377.08 ml       Admit weight: Weight: 88.9 kg (195 lb 15.8 oz)  Current weight: Weight: 87.7 kg (193 lb 5.5 oz) (23 0600)    Labs:  Recent Labs     23  1305 23  0140 23  0045   WBC  --  9.6 8.7   RBC  --  3.73* 3.29*   HEMOGLOBIN 14.1 12.2* 10.6*   HEMATOCRIT 42.3 36.5* 32.0*   MCV  --  97.9* 97.3   MCH  --  32.7 32.2   MCHC  --  33.4 33.1   RDW  --  44.3 44.3   PLATELETCT 194 196 167   MPV  --  10.5 10.5       Recent Labs     23  0140 23  0510 23  0045   SODIUM 139  --  135   POTASSIUM 4.4 4.9 4.3   CHLORIDE 106  --  102   CO2 22  --  27   GLUCOSE 122*  --  117*   BUN 20  --  22   CREATININE 0.70  --  0.95   CALCIUM 7.9*  --  8.1*       Recent Labs     23  1305   APTT 30.9   INR 1.33*         HgbA1c:  5.7    Diabetic Educator Consult:  N\A    Medications:  Scheduled Medications   Medication Dose Frequency    furosemide  40 mg BID DIURETIC    potassium chloride SA  20 mEq BID    enoxaparin (LOVENOX)  injection  40 mg DAILY AT 1800    Nozin nasal  swab  1 Applicator BID    Pharmacy Consult Request  1 Each PHARMACY TO DOSE    acetaminophen  1,000 mg Q6HRS    senna-docusate  2 Tablet BID    And    polyethylene glycol/lytes  1 Packet DAILY    And    magnesium hydroxide  30 mL DAILY    omeprazole  20 mg DAILY    metoprolol tartrate  25 mg BID    aspirin  81 mg DAILY    clopidogrel  75 mg DAILY    atorvastatin  40 mg QHS        Exam:   Physical Exam  Constitutional:       General: He is not in acute distress.     Appearance: He is well-developed. He is not diaphoretic.   Cardiovascular:      Rate and Rhythm: Normal rate and regular rhythm.      Heart sounds: Normal heart sounds. No murmur heard.     No friction rub. No gallop.   Pulmonary:      Effort: Pulmonary effort is normal. No respiratory distress.      Breath sounds: Examination of the right-lower field reveals decreased breath sounds. Examination of the left-lower field reveals decreased breath sounds. Decreased breath sounds present. No wheezing or rales.   Abdominal:      General: There is no distension.      Palpations: Abdomen is soft.      Tenderness: There is no abdominal tenderness.   Musculoskeletal:      Cervical back: Neck supple.   Skin:     General: Skin is warm and dry.      Comments: Sternum and EVH site   Neurological:      Mental Status: He is alert and oriented to person, place, and time. Mental status is at baseline.         Cardiac Medications:    ASA - Yes    Plavix - Yes    Post-operative Beta Blockers - Yes    Ace/ARB- No; contraindicated because of Normal EF    Statin - Yes    Aldactone- No; contraindicated because of Normal EF    SGLT2i-  No contraindicated because of No; contraindicated because of Normal EF    Ejection Fraction:  50%    Telemetry:   12/19 SR  12/20 SR    Assessment/Plan:  POD 1 HOTN- on low dose levo, SR, d/c mediastinal tubes, diurese when able, enc IS/ambulation    POD 2  HDS, SR, neuro intact, wounds  intact, abdomen soft, fluid balance negative, wt below admission wt,  0.5 L NC.  Plan: Keep pacing wires. IS/ambulate. Transfer to tele.     Disposition:  TBD

## 2023-12-20 NOTE — THERAPY
Physical Therapy   Initial Evaluation     Patient Name: Pa Sinclair  Age:  68 y.o., Sex:  male  Medical Record #: [id number]  Today's Date: 12/20/2023     Precautions  Precautions: Sternal Precautions (See Comments);Cardiac Precautions (See Comments);Fall Risk    Assessment  Patient is 68 y.o. male s/p CABG x 3.  Additional factors influencing patient status / progress : today, pt is alert, following education well, packet given and reviewed. Pt tolerates ambulation well while using FWW, no SOB, no strength or balance issues. PT to follow to trial full flight stairs and answer any questions re education handout. See details below. Pt's SO will be coming to stay with pt to assist as needed.      Plan    Physical Therapy Initial Treatment Plan   Treatment Plan : Bed Mobility, Gait Training, Neuro Re-Education / Balance, Stair Training, Therapeutic Activities  Treatment Frequency: 3 Times per Week  Duration: Until Therapy Goals Met    DC Equipment Recommendations: None  Discharge Recommendations: Other - (phase II CR)            Objective       12/20/23 1157   Charge Group   PT Evaluation PT Evaluation Mod   PT Self Care / Home Evaluation (Units) 1   Total Time Spent   PT Total Time Yes   PT Evaluation Time Spent (Mins) 20   PT Self Care/Home Evaluation Time Spent (Mins) 8   PT Total Time Spent (Calculated) 28   Initial Contact Note    Initial Contact Note Order Received and Verified, Physical Therapy Evaluation in Progress with Full Report to Follow.   Precautions   Precautions Sternal Precautions (See Comments);Cardiac Precautions (See Comments);Fall Risk   Vitals   Pulse 67  (and 76 after walking)   Pain 0 - 10 Group   Therapist Pain Assessment During Activity;Nurse Notified  (sternum, not rated, mild pain)   Prior Living Situation   Prior Services None   Housing / Facility 2 Story House   Steps Into Home 2   Steps In Home   (flight)   Rail None;Right Rail  (Steps in Home)   Equipment Owned None   Lives with -  Patient's Self Care Capacity   (SO will be coming to stay with patient. Brother and Mom live close by and can help)   Prior Level of Functional Mobility   Bed Mobility Independent   Transfer Status Independent   Ambulation Independent   Ambulation Distance community   Assistive Devices Used None   Stairs Independent   Cognition    Cognition / Consciousness WDL   Level of Consciousness Alert   Active ROM Lower Body    Active ROM Lower Body  WDL   Strength Lower Body   Lower Body Strength  WDL   Balance Assessment   Sitting Balance (Static) Fair   Sitting Balance (Dynamic) Fair   Standing Balance (Static) Fair   Standing Balance (Dynamic) Fair   Weight Shift Sitting Good   Weight Shift Standing Good   Comments with FWW   Bed Mobility    Supine to Sit   (up chair)   Sit to Supine   (up chair)   Scooting Supervised  (good compliance with sternal precautions)   Comments discussed options for sleep: pt plans to sleep in recliner at first, then back to his bed.   Gait Analysis   Gait Level Of Assist Standby Assist   Assistive Device Front Wheel Walker   Distance (Feet) 400   # of Times Distance was Traveled 1   # of Stairs Climbed 5   Level of Assist with Stairs Standby Assist   Weight Bearing Status sternal precautions   Functional Mobility   Sit to Stand Supervised   Bed, Chair, Wheelchair Transfer Supervised   Transfer Method Stand Pivot   Comments good compliance with precautions   How much difficulty does the patient currently have...   Turning over in bed (including adjusting bedclothes, sheets and blankets)? 3   Sitting down on and standing up from a chair with arms (e.g., wheelchair, bedside commode, etc.) 4   Moving from lying on back to sitting on the side of the bed? 3   How much help from another person does the patient currently need...   Moving to and from a bed to a chair (including a wheelchair)? 4   Need to walk in a hospital room? 4   Climbing 3-5 steps with a railing? 4   6 clicks Mobility Score 22    Activity Tolerance   Sitting in Chair 20+   Standing 10+   Short Term Goals    Short Term Goal # 1 Pt will negotiate one flight stairs with supervision in 6 visits to access full home.   Short Term Goal # 2 Pt will ambulate x 400 feet using no AD with supervision in 6 visits to improve functional indep.   Short Term Goal # 3 Pt will review education packet and ask questions as needed in 6 visits.   Education Group   Education Provided Sternal Precautions   Sternal Precautions Patient Response Patient;Acceptance;Explanation;Verbal Demonstration   Additional Comments education packet given and reviewed completely with patient.   Physical Therapy Initial Treatment Plan    Treatment Plan  Bed Mobility;Gait Training;Neuro Re-Education / Balance;Stair Training;Therapeutic Activities   Treatment Frequency 3 Times per Week   Duration Until Therapy Goals Met   Problem List    Problems Limited Knowledge of Post-Op Precautions;Decreased Activity Tolerance   Anticipated Discharge Equipment and Recommendations   DC Equipment Recommendations None   Discharge Recommendations Other -  (phase II CR)   Interdisciplinary Plan of Care Collaboration   IDT Collaboration with  Nursing   Patient Position at End of Therapy Seated;Call Light within Reach;Tray Table within Reach;Phone within Reach   Collaboration Comments nsg updated   Session Information   Date / Session Number  12/20-1 (1/3, 12/26)

## 2023-12-20 NOTE — CARE PLAN
Problem: Hyperinflation  Goal: Prevent or improve atelectasis  Description: Target End Date:  3 to 4 days    1. Instruct incentive spirometry usage  2.  Perform hyperinflation therapy as indicated  Outcome: Progressing  Flowsheets (Taken 12/18/2023 1654)  Hyperinflation Protocol Goals/Outcome: Increase and/or stable inspiratory capacity for 24 hours  Hyperinflation Protocol Indications: Chest Trauma (Blunt, Penetrative, Crushing, or Surgical)  Note:     Respiratory Update    Treatment modality: PEP  Frequency: QID  IS 1500    Pt tolerating current treatments well with no adverse reactions.

## 2023-12-20 NOTE — THERAPY
"Occupational Therapy   Initial Evaluation     Patient Name: Pa Sinclair  Age:  68 y.o., Sex:  male  Medical Record #: [id number]  Today's Date: 12/20/2023       Precautions: Sternal Precautions (See Comments), Cardiac Precautions (See Comments), Fall Risk    Assessment  Patient is 68 y.o. male with a PMHx of CAD, DLD and hypertension who presented 12/18/2023 for elective CABG x 3.   Pt educated today on sternal precautions during ADL's & homemaking tasks.  Pt was pleasant, very receptive to new learning & asked great questions.   Pt currently is able to complete basic ADL's & functional mobility with supervision.  Pt stated his SO plans to come stay with him at D/C.  Pt should progress very well & be able to D/C home with no additional OT services.    Plan    Occupational Therapy Initial Treatment Plan   Duration: Discharge Needs Only    DC Equipment Recommendations: None  Discharge Recommendations: Anticipate that the patient will have no further occupational therapy needs after discharge from the hospital     Subjective    \"How long before I'll be able to drive?\"     Objective      Initial Contact Note    Initial Contact Note Order Received and Verified, Evaluation Only - Patient Does Not Require Further Acute Occupational Therapy at this Time.  However, May Benefit from Post Acute Therapy for Higher Level Functional Deficits.   Prior Living Situation   Prior Services None   Housing / Facility 2 Story House   Steps Into Home 2   Steps In Home   (FOS)   Rail Right Rail  (Steps in Home)   Bathroom Set up Walk In Shower;Bathtub / Shower Combination;Shower Chair   Equipment Owned Tub / Shower Seat   Lives with - Patient's Self Care Capacity Alone and Able to Care For Self   Comments Pt reports his SO will stay with him at D/C.  Pt also has a brother & a mother that live close by.   Prior Level of ADL Function   Self Feeding Independent   Grooming / Hygiene Independent   Bathing Independent   Dressing Independent "   Toileting Independent   Prior Level of IADL Function   Medication Management Independent   Laundry Independent   Kitchen Mobility Independent   Finances Independent   Home Management Independent   Shopping Independent   Prior Level Of Mobility Independent Without Device in Community   Driving / Transportation Driving Independent   Occupation (Pre-Hospital Vocational) Retired Due To Age   Precautions   Precautions Cardiac Precautions (See Comments);Sternal Precautions (See Comments)   Vitals   O2 Delivery Device None - Room Air   Pain   Pain Scales 0 to 10 Scale    Intervention Ambulation / Increased Activity   Pain 0 - 10 Group   Location Incision;Sternum   Location Orientation Mid   Description Tender;Sore   Therapist Pain Assessment During Activity;Nurse Notified;4   Cognition    Cognition / Consciousness WDL   Level of Consciousness Alert   Comments pt very pleasant, co-operative & receptive to new learning   Passive ROM Upper Body   Passive ROM Upper Body WDL   Active ROM Upper Body   Active ROM Upper Body  WDL   Strength Upper Body   Upper Body Strength  Not Tested   Comments pt appears to have equal strength WFL BUE   Coordination Upper Body   Coordination WDL   Balance Assessment   Sitting Balance (Static) Fair +   Sitting Balance (Dynamic) Fair   Standing Balance (Static) Fair   Standing Balance (Dynamic) Fair   Weight Shift Sitting Good   Weight Shift Standing Good   ADL Assessment   Eating Modified Independent   Grooming Supervision;Standing   Upper Body Dressing Supervision   Lower Body Dressing Supervision   Toileting Supervision   Functional Mobility   Sit to Stand Supervised   Bed, Chair, Wheelchair Transfer Supervised   Toilet Transfers Supervised   Transfer Method Stand Step   Mobility pt just returned from a walk on Nsg unit with staff   ICU Target Mobility Level   ICU Mobility - Targeted Level Level 4   Activity Tolerance   Sitting in Chair 35   Sitting Edge of Bed 5   Standing 10   Education  Group   Education Provided Cardiac Precautions;Sternal Precautions;Home Safety;Activities of Daily Living   Role of Occupational Therapist Patient Response Patient;Acceptance;Explanation;Verbal Demonstration   Cardiac Precautions Patient Response Patient;Acceptance;Explanation;Demonstration;Handout;Verbal Demonstration;Action Demonstration   Sternal Precautions Patient Response Patient;Acceptance;Explanation;Demonstration;Handout;Verbal Demonstration;Action Demonstration   Home Safety Patient Response Patient;Acceptance;Explanation;Demonstration;Verbal Demonstration   ADL Patient Response Patient;Acceptance;Demonstration;Explanation;Action Demonstration   Occupational Therapy Initial Treatment Plan    Duration Discharge Needs Only   Anticipated Discharge Equipment and Recommendations   DC Equipment Recommendations None   Discharge Recommendations Anticipate that the patient will have no further occupational therapy needs after discharge from the hospital   Interdisciplinary Plan of Care Collaboration   IDT Collaboration with  Nursing;Physical Therapist   Patient Position at End of Therapy Seated;Call Light within Reach;Tray Table within Reach;Phone within Reach   Collaboration Comments Nsg & PT updated on OT findings   Session Information   Date / Session Number  12/20 - D/C needs only

## 2023-12-20 NOTE — PROGRESS NOTES
Critical Care Progress Note    Date of admission  12/18/2023    Chief Complaint  68 y.o. male w/PMHx CAD, DLD and hypertension who presented 12/18/2023 for elective CABG three-vessel.  Patient's history notable for exertional chest pain and abnormal cardiac catheterization significant for multivessel disease.  Case reviewed at the bedside with cardiac surgery and cardiac anesthesia postop.  Patient did well but venous grafts were unusable and he had bilateral mammary artery bypass with 3 vessels.  Hemodynamics acceptable postop.  Coming off pump RV and LV function was normal.  Pacer wires in place and functional but has not required pacing.  Sedate on dexmedetomidine coming to the unit.  On insulin at 6 units/h for hyperglycemia.  Alternating between low-dose clevidipine and low-dose norepinephrine for labile BP.  Urine output adequate.  Has not required any blood product infusion and received 350 of Cell Saver.  ([facility] 12/18)       Hospital Course  No notes on file    Interval Problem Update  Reviewed last 24 hour events:    POD #2-CABG  A&O x4  Feels better today  Up in a chair for breakfast  SR 60-70s  V wires capped  SBP 90-100s  Norepinephrine weaned off yesterday  UO adequate  Work of breathing low  O2 saturations 89-96%  O2 NC  0.5-1 LPM  Chest tubes removed  CXR: Unchanged mild edema, minimal atelectasis  Tmax 100.4, WBC 8.7  Hemoglobin 10.6,   Chemistries grossly normal  Glucose 117, has not required insulin       YESTERDAY  POD#1 CABG 3V-2 mammaries, and lung resection    Extubated yesterday  A&O x 4  Denies shortness of breath, some incisional chest pain  SB/SR 50-60s  SBP 80-90s  Norepinephrine 0.03  UO adequate, fluid balance +1.763 L  1 L nasal cannula  O2 sats 94-97%  WOB low  CXR: Extubated, smallish volumes, atelectasis/edema, no PTX  CT no air leak  I-S 1750  Tmax 99.0, WBC 9.6  Hemoglobin 12.2,   , K4.4, HCO3 22, AG 11  BUN 20, creatinine 0.7  Glucose 131, 110, 118, 118,  132, 133, 119  Insulin infusion discontinued, glucose levels controlled    Case reviewed with [facility] at the bedside    Failed weaning off NE, dropped SBp to 70s back on NE 0.02  ALB trial    Review of Systems  Review of Systems   Constitutional:  Negative for chills, fever and malaise/fatigue.   HENT:  Negative for congestion and sore throat.    Eyes: Negative.    Respiratory:  Negative for cough, sputum production and shortness of breath.    Cardiovascular:  Positive for chest pain (Incisional-improved). Negative for palpitations, orthopnea and PND.   Gastrointestinal:  Positive for vomiting (Resolved). Negative for diarrhea and nausea (Resolved).   Genitourinary: Negative.         Has Sauer catheter   Musculoskeletal: Negative.    Neurological:  Negative for focal weakness and headaches.   Endo/Heme/Allergies:  Does not bruise/bleed easily.   Psychiatric/Behavioral:  The patient is not nervous/anxious.         Vital Signs for last 24 hours   Temp:  [37.5 °C (99.5 °F)-37.8 °C (100 °F)] 37.8 °C (100 °F)  Pulse:  [63-81] 65  Resp:  [16-22] 22  BP: ()/(55-73) 97/55  SpO2:  [89 %-97 %] 89 %    Hemodynamic parameters for last 24 hours       Respiratory Information for the last 24 hours       Physical Exam   Physical Exam  Vitals reviewed.   Constitutional:       Appearance: He is normal weight. He is not ill-appearing or diaphoretic.      Interventions: Nasal cannula in place.   HENT:      Head: Normocephalic and atraumatic.      Mouth/Throat:      Mouth: Mucous membranes are moist.   Eyes:      General: No scleral icterus.     Extraocular Movements: Extraocular movements intact.      Pupils: Pupils are equal, round, and reactive to light.   Neck:      Vascular: No JVD.      Comments: R IJ CVC, site looks good  Cardiovascular:      Rate and Rhythm: Normal rate and regular rhythm. No extrasystoles are present.     Pulses: Normal pulses.      Heart sounds: No murmur heard.     No gallop.      Comments: SR  Pulmonary:       Effort: Pulmonary effort is normal. No respiratory distress.      Breath sounds: No wheezing, rhonchi or rales.   Chest:      Comments: V wires capped, mediastinal chest tubes removed  Abdominal:      General: Abdomen is flat. Bowel sounds are normal. There is no distension.      Palpations: Abdomen is soft. There is no mass.      Tenderness: There is no abdominal tenderness. There is no right CVA tenderness, left CVA tenderness, guarding or rebound.      Hernia: No hernia is present.   Genitourinary:     Comments: Sauer being out now  Musculoskeletal:      Cervical back: Neck supple.      Right lower leg: No edema.      Left lower leg: No edema.      Comments: Left lower extremity surgical dressing, venous harvest site, no change   Lymphadenopathy:      Cervical: No cervical adenopathy.   Skin:     General: Skin is warm and dry.      Capillary Refill: Capillary refill takes less than 2 seconds.      Coloration: Skin is not cyanotic or mottled.      Nails: There is no clubbing.   Neurological:      General: No focal deficit present.      Mental Status: He is alert and oriented to person, place, and time. Mental status is at baseline.      GCS: GCS eye subscore is 4. GCS verbal subscore is 5. GCS motor subscore is 6.   Psychiatric:         Mood and Affect: Mood normal.         Speech: Speech normal.         Behavior: Behavior normal. Behavior is cooperative.         Thought Content: Thought content normal.         Cognition and Memory: Cognition normal.         Medications  Current Facility-Administered Medications   Medication Dose Route Frequency Provider Last Rate Last Admin    furosemide (Lasix) injection 40 mg  40 mg Intravenous BID DIURETIC Hermelinda Baker A.P.N.   40 mg at 12/20/23 0545    potassium chloride SA (Kdur) tablet 20 mEq  20 mEq Oral BID Hermelinda Baker, A.P.N.   20 mEq at 12/20/23 0545    dextrose 10 % BOLUS 25 g  25 g Intravenous Q15 MIN PRN Josh Bearden D.O.        Respiratory  Therapy Consult   Nebulization Continuous RT MAR EliPRajendraRANTHONY.        NS infusion   Intravenous Continuous MAR EliP.R.N.   Stopped at 12/19/23 1119    NS infusion   Intravenous Continuous MAR EliP.R.N.   Stopped at 12/19/23 0413    electrolyte-A (Plasmalyte-A) infusion   Intravenous PRN MAR EliP.R.N.   Stopped at 12/19/23 0400    enoxaparin (Lovenox) inj 40 mg  40 mg Subcutaneous DAILY AT 1800 CHALO Eli.P.R.N.   40 mg at 12/19/23 1704    Nozin nasal  swab  1 Applicator Each Nostril BID TOI EliRANTHONY.   1 Applicator at 12/20/23 0825    nitroglycerin 50 mg in D5W 250 ml infusion  0-100 mcg/min Intravenous Continuous MAR EliP.R.ANDREA   Dose not Required at 12/18/23 1330    Pharmacy Consult Request ...Pain Management Review 1 Each  1 Each Other PHARMACY TO DOSE MAR EliP.R.NRajendra        acetaminophen (Tylenol) tablet 1,000 mg  1,000 mg Oral Q6HRS CHALO Eli.P.R.N.   1,000 mg at 12/20/23 1200    Followed by    [START ON 12/28/2023] acetaminophen (Tylenol) tablet 1,000 mg  1,000 mg Oral Q6HRS PRN MAR EliP.R.N.        oxyCODONE immediate-release (Roxicodone) tablet 5 mg  5 mg Oral Q3HRS PRN CHALO Eli.P.R.N.   5 mg at 12/19/23 1324    Or    oxyCODONE immediate release (Roxicodone) tablet 10 mg  10 mg Oral Q3HRS PRN CHALO Eli.P.R.N.   10 mg at 12/19/23 2255    Or    fentaNYL (Sublimaze) injection 50 mcg  50 mcg Intravenous Q3HRS PRN CHALO Eli.P.R.N.   50 mcg at 12/18/23 1602    traMADol (Ultram) 50 MG tablet 50 mg  50 mg Oral Q4HRS PRN CHALO Eli.P.R.N.        midazolam (Versed) injection 2 mg  2 mg Intravenous Q HOUR PRN CHALO Eli.P.R.N.        sodium bicarbonate 8.4 % injection 50 mEq  50 mEq Intravenous Q HOUR PRN CHALO Eli.P.R.N.        morphine 4 MG/ML injection 4 mg  4 mg Intravenous Q HOUR PRN CHALO Eli.P.R.N.        ondansetron  (Zofran) syringe/vial injection 8 mg  8 mg Intravenous Q6HRS PRN Jennifer Beltran A.P.R.N.   8 mg at 12/19/23 0529    Or    prochlorperazine (Compazine) injection 10 mg  10 mg Intravenous Q6HRS PRN Jennifer Beltran A.P.R.N.        senna-docusate (Pericolace Or Senokot S) 8.6-50 MG per tablet 2 Tablet  2 Tablet Oral BID Jennifer eBltran A.P.R.N.   2 Tablet at 12/20/23 0546    And    polyethylene glycol/lytes (Miralax) Packet 1 Packet  1 Packet Oral DAILY CHALO Eli.P.R.N.   1 Packet at 12/20/23 0547    And    magnesium hydroxide (Milk Of Magnesia) suspension 30 mL  30 mL Oral DAILY Jennifer Beltran A.P.R.N.        And    bisacodyl (Dulcolax) suppository 10 mg  10 mg Rectal QDAY PRN Jennifer Beltran A.P.R.N.        omeprazole (PriLOSEC) capsule 20 mg  20 mg Oral DAILY Jennifer Beltran A.P.R.N.   20 mg at 12/20/23 0546    mag hydrox-al hydrox-simeth (Maalox Plus Es Or Mylanta Ds) suspension 30 mL  30 mL Oral Q4HRS PRN Jennifer Beltran A.P.R.N.        diphenhydrAMINE (Benadryl) tablet/capsule 25 mg  25 mg Oral HS PRN - MR X 1 Jennifer Beltran A.P.R.N.   25 mg at 12/20/23 0109    metoprolol tartrate (Lopressor) tablet 25 mg  25 mg Oral BID Jennifer Beltran A.P.R.N.   25 mg at 12/20/23 0546    aspirin EC tablet 81 mg  81 mg Oral DAILY Jennifer Beltran, A.P.R.N.   81 mg at 12/20/23 0546    clopidogrel (Plavix) tablet 75 mg  75 mg Oral DAILY Jennifer Beltran A.P.R.N.   75 mg at 12/20/23 0546    atorvastatin (Lipitor) tablet 40 mg  40 mg Oral QHS MAR EliP.R.N.   40 mg at 12/19/23 2038    norepinephrine (Levophed) 8 mg in 250 mL NS infusion (premix)  0-1 mcg/kg/min (Ideal) Intravenous Continuous CHALO Whitlock.P.N.   Stopped at 12/19/23 1214    clevidipine (Cleviprex) IV emulsion  0-21 mg/hr Intravenous Continuous CHALO Whitlock.P.N.   Dose not Required at 12/18/23 1300    dexmedetomidine (PRECEDEX) 400 mcg/100mL NS premix infusion  0.1-1.5 mcg/kg/hr (Ideal) Intravenous Continuous  CYNTHIA Whitlock.   Stopped at 12/19/23 0320       Fluids    Intake/Output Summary (Last 24 hours) at 12/20/2023 1227  Last data filed at 12/20/2023 1000  Gross per 24 hour   Intake 1522.92 ml   Output 3950 ml   Net -2427.08 ml       Laboratory  Recent Labs     12/18/23  1308 12/18/23  1400 12/18/23  1511   ISTATAPH 7.297* 7.417 7.349*   ISTATAPCO2 47.3* 32.2 37.1*   ISTATAPO2 239* 144* 123*   ISTATATCO2 25 22 22   BKBQCFY2JPN 100* 99 99   ISTATARTHCO3 23.1 20.7 20.4   ISTATARTBE -4 -3 -5*   ISTATTEMP 36.4 C 36.3 C 36.5 C   ISTATFIO2 100 70 50   ISTATSPEC Arterial Arterial Arterial   ISTATAPHTC 7.306* 7.427 7.356*   [id number] 236* 139* 120*         Recent Labs     12/18/23  1305 12/18/23  1723 12/19/23  0140 12/19/23  0510 12/20/23  0045   SODIUM  --   --  139  --  135   POTASSIUM 3.9   < > 4.4 4.9 4.3   CHLORIDE  --   --  106  --  102   CO2  --   --  22  --  27   BUN  --   --  20  --  22   CREATININE  --   --  0.70  --  0.95   MAGNESIUM 2.9*  --   --   --   --    CALCIUM  --   --  7.9*  --  8.1*    < > = values in this interval not displayed.     Recent Labs     12/19/23  0140 12/20/23  0045   GLUCOSE 122* 117*     Recent Labs     12/19/23  0140 12/20/23  0045   WBC 9.6 8.7     Recent Labs     12/18/23  1305 12/19/23  0140 12/20/23  0045   RBC  --  3.73* 3.29*   HEMOGLOBIN 14.1 12.2* 10.6*   HEMATOCRIT 42.3 36.5* 32.0*   PLATELETCT 194 196 167   PROTHROMBTM 16.6*  --   --    APTT 30.9  --   --    INR 1.33*  --   --        Imaging  X-Ray:  I have personally reviewed the images and compared with prior images.  EKG:  I have personally reviewed the images and compared with prior images.  CT:    Reviewed  Echo:   Reviewed    Assessment/Plan  * S/P CABG x 3  Assessment & Plan  S/p bilateral mammary artery bypass to 3 vessels 12/19, see operative note  Patient also had partial lung resection see operative note  Dr. Bearden  Extubated 12/19  Titrating norepinephrine for hypotension-vasoplegia improved, weaned  off norepinephrine 12/19  Clevidipine weaned off  V wires present, has not required pacing-capped  Mediastinal chest tubes removed  No PTX on chest x-ray post lung resection  Plasma-Lyte fluid bolus as needed for relative hypovolemia versus IV albumin  Note CVC is in IJ transitioning out into subclavian  Forced diuresis for mild pulmonary edema  Transition to beta-blocker/ACE when hemodynamics allow-hopefully start metoprolol today  DAPT  Lovenox PPx when okay with CVS  PPI PPx  Mobilize  Cardiac rehab-risk factor modification    Encounter for weaning from ventilator (HCC)  Assessment & Plan  Debated electively 12/18 by cardiac anesthesia for CABG  RT protocols  Extubated less than 6 hours postop  Incentive spirometry-volumes excellent-encouraged  Starting to mobilize  Forced diuresis with Lasix  Follow-up chest x-ray if remains on oxygen tomorrow    Hyperglycemia  Assessment & Plan  Transitioned off insulin infusion early due to normal glycemia  Has not required SSI, DC insulin protocols  Hypoglycemia protocols  No Hx DM  A1c 5.7    Primary hypertension- (present on admission)  Assessment & Plan  Resume home regimen as clinically appropriate  Ideally ACE/BB post op  Vasoplegia improved, weaned off norepinephrine but blood pressure a bit soft  Hopefully start metoprolol today as hemodynamics allow    CAD (coronary artery disease)- (present on admission)  Assessment & Plan  History of multivessel disease by heart catheterization  Preoperative evaluation included:    ECHOCARDIOGRAM 11/20/23 [facility]:  1. The left ventricle is normal in size and systolic function with an ejection fraction of 55-60% by Santa's biplane with no wall motion abnormalities noted. There is grade 1 (impaired relaxation) diastolic function with normal LV filling pressures.  2. The right atrium size is mildly dilated  3. Mild aortic regurgitation  4. Mild mitral regurgitation  5. The ascending aorta measures 4.0 cm.  6. The right ventricle is  normal in size and function; RVSP is normal at 33 mmHg with a RAP of 8 mmHg.     CARDIAC CATHETERIZATION 11/20/23 [facility] [place]:  LEFT VENTRICLE: LVEDP was 16 mmHg, no significant gradient across the aortic valve on pullback.  LEFT MAIN: Large-caliber vessel bifurcating into an LAD and left circumflex without angiographically significant atherosclerotic disease  LAD: Large-caliber, heavily calcified vessel, the LAD has severe disease and  in the proximal segment after the take off of a diagonal. The LAD and the second diagonal fills via a collateral from the proximal RCA. The first diagonal is a medium sized vessel with 60-70% stenosis proximally.  LCX: Large-caliber vessel giving off 2 large OM branches and terminates in the AV groove. The Ostial and proximal LCX has 2 tandem 85-90% stenosis. The lesion is just before take off of an OM1. The OM2 has mild to moderate disease in the proximal segment.  RCA: The RCA is  in the proximal segment but supplies collateral to the LAD. The distal RCA fills via collaterals from the AV groove LCX.    Final impressions:  - Severe calcified 3 vessel disease involving  of the proximal LAD,  of the proximal RCA and Severe disease of the LCX  - Collaterals to the LAD from the right system and Collaterals to the RCA from the AV groove LCX     Risk factor modification long-term-reviewed with patient at length    High cholesterol- (present on admission)  Assessment & Plan  Statin  Dietary education long-term         VTE:  Lovenox  Ulcer: PPI  Lines: Central Line  Ongoing indication addressed, Arterial Line  Ongoing indication addressed, and Sauer Catheter  Ongoing indication addressed    I have performed a physical exam and reviewed and updated ROS and Plan today (12/20/2023). In review of yesterday's note (12/19/2023), there are no changes except as documented above.     Discussed patient condition and risk of morbidity and/or mortality with RN, RT, Pharmacy, Charge nurse / hot  rounds, Patient, and CVS    No critical care issues, RCC will sign off, please call if we can be of assistance.

## 2023-12-21 LAB
ANION GAP SERPL CALC-SCNC: 8 MMOL/L (ref 7–16)
BUN SERPL-MCNC: 17 MG/DL (ref 8–22)
CALCIUM SERPL-MCNC: 8.3 MG/DL (ref 8.5–10.5)
CHLORIDE SERPL-SCNC: 99 MMOL/L (ref 96–112)
CO2 SERPL-SCNC: 29 MMOL/L (ref 20–33)
CREAT SERPL-MCNC: 0.73 MG/DL (ref 0.5–1.4)
ERYTHROCYTE [DISTWIDTH] IN BLOOD BY AUTOMATED COUNT: 44.5 FL (ref 35.9–50)
GFR SERPLBLD CREATININE-BSD FMLA CKD-EPI: 98 ML/MIN/1.73 M 2
GLUCOSE SERPL-MCNC: 128 MG/DL (ref 65–99)
HCT VFR BLD AUTO: 30.8 % (ref 42–52)
HGB BLD-MCNC: 10.2 G/DL (ref 14–18)
MAGNESIUM SERPL-MCNC: 2.1 MG/DL (ref 1.5–2.5)
MCH RBC QN AUTO: 32.6 PG (ref 27–33)
MCHC RBC AUTO-ENTMCNC: 33.1 G/DL (ref 32.3–36.5)
MCV RBC AUTO: 98.4 FL (ref 81.4–97.8)
PLATELET # BLD AUTO: 175 K/UL (ref 164–446)
PMV BLD AUTO: 10.8 FL (ref 9–12.9)
POTASSIUM SERPL-SCNC: 4.1 MMOL/L (ref 3.6–5.5)
POTASSIUM SERPL-SCNC: 4.3 MMOL/L (ref 3.6–5.5)
RBC # BLD AUTO: 3.13 M/UL (ref 4.7–6.1)
SODIUM SERPL-SCNC: 136 MMOL/L (ref 135–145)
WBC # BLD AUTO: 8.2 K/UL (ref 4.8–10.8)

## 2023-12-21 PROCEDURE — 700105 HCHG RX REV CODE 258: Performed by: NURSE PRACTITIONER

## 2023-12-21 PROCEDURE — 80048 BASIC METABOLIC PNL TOTAL CA: CPT

## 2023-12-21 PROCEDURE — 700102 HCHG RX REV CODE 250 W/ 637 OVERRIDE(OP): Mod: JZ | Performed by: NURSE PRACTITIONER

## 2023-12-21 PROCEDURE — 770020 HCHG ROOM/CARE - TELE (206)

## 2023-12-21 PROCEDURE — 93005 ELECTROCARDIOGRAM TRACING: CPT | Performed by: THORACIC SURGERY (CARDIOTHORACIC VASCULAR SURGERY)

## 2023-12-21 PROCEDURE — A9270 NON-COVERED ITEM OR SERVICE: HCPCS | Mod: JZ | Performed by: NURSE PRACTITIONER

## 2023-12-21 PROCEDURE — P9045 ALBUMIN (HUMAN), 5%, 250 ML: HCPCS | Mod: JZ,JG | Performed by: NURSE PRACTITIONER

## 2023-12-21 PROCEDURE — 84132 ASSAY OF SERUM POTASSIUM: CPT

## 2023-12-21 PROCEDURE — 94760 N-INVAS EAR/PLS OXIMETRY 1: CPT

## 2023-12-21 PROCEDURE — 700102 HCHG RX REV CODE 250 W/ 637 OVERRIDE(OP): Performed by: NURSE PRACTITIONER

## 2023-12-21 PROCEDURE — 85027 COMPLETE CBC AUTOMATED: CPT

## 2023-12-21 PROCEDURE — 700111 HCHG RX REV CODE 636 W/ 250 OVERRIDE (IP): Mod: JZ | Performed by: NURSE PRACTITIONER

## 2023-12-21 PROCEDURE — 36415 COLL VENOUS BLD VENIPUNCTURE: CPT

## 2023-12-21 PROCEDURE — 83735 ASSAY OF MAGNESIUM: CPT

## 2023-12-21 PROCEDURE — 99024 POSTOP FOLLOW-UP VISIT: CPT | Performed by: NURSE PRACTITIONER

## 2023-12-21 PROCEDURE — 94669 MECHANICAL CHEST WALL OSCILL: CPT

## 2023-12-21 PROCEDURE — A9270 NON-COVERED ITEM OR SERVICE: HCPCS | Performed by: NURSE PRACTITIONER

## 2023-12-21 RX ORDER — ALBUMIN, HUMAN INJ 5% 5 %
25 SOLUTION INTRAVENOUS ONCE
Status: COMPLETED | OUTPATIENT
Start: 2023-12-21 | End: 2023-12-21

## 2023-12-21 RX ORDER — DEXTROSE MONOHYDRATE 50 MG/ML
INJECTION, SOLUTION INTRAVENOUS CONTINUOUS
Status: DISCONTINUED | OUTPATIENT
Start: 2023-12-21 | End: 2023-12-22

## 2023-12-21 RX ORDER — AMIODARONE HYDROCHLORIDE 200 MG/1
400 TABLET ORAL 2 TIMES DAILY
Status: DISCONTINUED | OUTPATIENT
Start: 2023-12-22 | End: 2023-12-23 | Stop reason: HOSPADM

## 2023-12-21 RX ADMIN — DOCUSATE SODIUM 50 MG AND SENNOSIDES 8.6 MG 2 TABLET: 8.6; 5 TABLET, FILM COATED ORAL at 18:10

## 2023-12-21 RX ADMIN — ACETAMINOPHEN 1000 MG: 500 TABLET, FILM COATED ORAL at 18:10

## 2023-12-21 RX ADMIN — Medication 1 APPLICATOR: at 09:14

## 2023-12-21 RX ADMIN — ALBUMIN (HUMAN) 25 G: 12.5 SOLUTION INTRAVENOUS at 09:32

## 2023-12-21 RX ADMIN — POTASSIUM CHLORIDE 20 MEQ: 1500 TABLET, EXTENDED RELEASE ORAL at 05:26

## 2023-12-21 RX ADMIN — METOPROLOL TARTRATE 25 MG: 25 TABLET, FILM COATED ORAL at 18:10

## 2023-12-21 RX ADMIN — DEXTROSE MONOHYDRATE: 50 INJECTION, SOLUTION INTRAVENOUS at 20:04

## 2023-12-21 RX ADMIN — AMIODARONE HYDROCHLORIDE 150 MG: 1.5 INJECTION, SOLUTION INTRAVENOUS at 18:10

## 2023-12-21 RX ADMIN — Medication 1 APPLICATOR: at 19:56

## 2023-12-21 RX ADMIN — ACETAMINOPHEN 1000 MG: 500 TABLET, FILM COATED ORAL at 05:26

## 2023-12-21 RX ADMIN — ACETAMINOPHEN 1000 MG: 500 TABLET, FILM COATED ORAL at 00:42

## 2023-12-21 RX ADMIN — FUROSEMIDE 40 MG: 10 INJECTION, SOLUTION INTRAVENOUS at 05:26

## 2023-12-21 RX ADMIN — ACETAMINOPHEN 1000 MG: 500 TABLET, FILM COATED ORAL at 14:55

## 2023-12-21 RX ADMIN — ENOXAPARIN SODIUM 40 MG: 100 INJECTION SUBCUTANEOUS at 18:11

## 2023-12-21 RX ADMIN — DOCUSATE SODIUM 50 MG AND SENNOSIDES 8.6 MG 2 TABLET: 8.6; 5 TABLET, FILM COATED ORAL at 05:26

## 2023-12-21 RX ADMIN — ASPIRIN 81 MG: 81 TABLET, COATED ORAL at 05:26

## 2023-12-21 RX ADMIN — OMEPRAZOLE 20 MG: 20 CAPSULE, DELAYED RELEASE ORAL at 05:27

## 2023-12-21 RX ADMIN — CLOPIDOGREL BISULFATE 75 MG: 75 TABLET ORAL at 05:26

## 2023-12-21 RX ADMIN — METOPROLOL TARTRATE 25 MG: 25 TABLET, FILM COATED ORAL at 05:26

## 2023-12-21 RX ADMIN — AMIODARONE HYDROCHLORIDE 1 MG/MIN: 1.8 INJECTION, SOLUTION INTRAVENOUS at 18:19

## 2023-12-21 RX ADMIN — ATORVASTATIN CALCIUM 40 MG: 40 TABLET, FILM COATED ORAL at 19:57

## 2023-12-21 ASSESSMENT — COGNITIVE AND FUNCTIONAL STATUS - GENERAL
DRESSING REGULAR LOWER BODY CLOTHING: A LITTLE
WALKING IN HOSPITAL ROOM: A LITTLE
HELP NEEDED FOR BATHING: A LITTLE
DAILY ACTIVITIY SCORE: 22
SUGGESTED CMS G CODE MODIFIER MOBILITY: CJ
CLIMB 3 TO 5 STEPS WITH RAILING: A LITTLE
MOBILITY SCORE: 22
SUGGESTED CMS G CODE MODIFIER DAILY ACTIVITY: CJ

## 2023-12-21 ASSESSMENT — LIFESTYLE VARIABLES
EVER HAD A DRINK FIRST THING IN THE MORNING TO STEADY YOUR NERVES TO GET RID OF A HANGOVER: NO
EVER FELT BAD OR GUILTY ABOUT YOUR DRINKING: NO
ALCOHOL_USE: NO
ON A TYPICAL DAY WHEN YOU DRINK ALCOHOL HOW MANY DRINKS DO YOU HAVE: 0
HAVE PEOPLE ANNOYED YOU BY CRITICIZING YOUR DRINKING: NO
HOW MANY TIMES IN THE PAST YEAR HAVE YOU HAD 5 OR MORE DRINKS IN A DAY: 0
TOTAL SCORE: 0
TOTAL SCORE: 0
AVERAGE NUMBER OF DAYS PER WEEK YOU HAVE A DRINK CONTAINING ALCOHOL: 0
TOTAL SCORE: 0
HAVE YOU EVER FELT YOU SHOULD CUT DOWN ON YOUR DRINKING: NO
CONSUMPTION TOTAL: NEGATIVE

## 2023-12-21 ASSESSMENT — FIBROSIS 4 INDEX: FIB4 SCORE: 1.15

## 2023-12-21 ASSESSMENT — PAIN DESCRIPTION - PAIN TYPE
TYPE: ACUTE PAIN
TYPE: ACUTE PAIN

## 2023-12-21 NOTE — PROGRESS NOTES
Assumed care of patient at bedside report from day shift RN. Updated on POC. Patient currently A & O x 4; on RA; PT placed on telemetry monitor, monitor room notified, pt SR on monitor; PT declines pain at this time; Call light within reach. Whiteboard updated. Fall precautions in place. Bed locked and in lowest position. All questions answered. No other needs indicated at this time.

## 2023-12-21 NOTE — DIETARY
Nutrition Services: Heart Healthy Education Consult   Day 3 of admit.  Pa Sinclair is a 68 y.o. male with admitting DX of Atherosclerotic heart disease of native coronary artery without angina pectoris [I25.10]    RD able to visit pt at bedside to provide heart healthy diet education. RD discussed low sodium and cholesterol, adding fiber and whole grains, sample menus, provided handout reinforcing topics discussed. Pt demonstrated great readiness and evidence of learning. RD able to answer all questions to patient's satisfaction.     No other education needs identified at this time. Consider referral to outpatient nutrition services for continuation of education as indicated or per pt preferences.     Please re-consult RD as indicated.

## 2023-12-21 NOTE — CARE PLAN
The patient is Watcher - Medium risk of patient condition declining or worsening    Shift Goals  Clinical Goals: Hemodynamic stability, improve IS  Patient Goals: Rest  Family Goals: MARGIE    Progress made toward(s) clinical / shift goals:      Problem: Knowledge Deficit - Standard  Goal: Patient and family/care givers will demonstrate understanding of plan of care, disease process/condition, diagnostic tests and medications  Outcome: Progressing  Note: Patient updated on plan of care. All questions answered. Patient verbalized understanding. No further questions at this time.       Problem: Skin Integrity  Goal: Skin integrity is maintained or improved  Outcome: Progressing  Note: Skin assessed. Skin breakdown preventions in place.       Problem: Post op day 2 CABG/Heart Valve Replacement  Goal: Optimal care of the post op CABG/heart valve replacement post op day 2  Outcome: Progressing  Intervention: Daily weights in the morning  Note: Documented  Intervention: Up in chair for all meals  Note: Pt was up in chair for all meals  Intervention: Ambulate 4 times daily, increasing the distance each time  Note: PT ambulated in  ft  Intervention: IS q 1 hour while awake and record best IS volume  Note: Best IS 1250       Patient is not progressing towards the following goals:

## 2023-12-21 NOTE — PROGRESS NOTES
Cardiovascular Surgery Progress Note    Name: Pa Sinclair  MRN: [id number]  : 1955  Admit Date: 2023  5:23 AM  3 Days Post-Op     Procedure:  Procedure(s) and Anesthesia Type:     * CORONARY ARTERY BYPASS GRAFT X 3, ENDOSCOPIC VEIN HARVEST - General     * ECHOCARDIOGRAM, TRANSESOPHAGEAL, INTRAOPERATIVE - General     * LEFT UPPER LOBE WEDGE RESECTION - General    Vitals:  Vitals:    23 2310 23 0501 23 0600 23 0747   BP: 105/63 109/61  94/56   Pulse: 79 66  68   Resp: 17 16  17   Temp: 37.5 °C (99.5 °F) 37.4 °C (99.3 °F)  36.6 °C (97.8 °F)   TempSrc: Temporal Temporal  Temporal   SpO2: 91% 94%  95%   Weight:   85.5 kg (188 lb 7.9 oz)    Height:          Temp (24hrs), Av °C (98.6 °F), Min:36.3 °C (97.4 °F), Max:37.5 °C (99.5 °F)      Respiratory:    Respiration: 17, Pulse Oximetry: 95 %       Fluids:    Intake/Output Summary (Last 24 hours) at 2023 0942  Last data filed at 2023 0700  Gross per 24 hour   Intake 250 ml   Output 2100 ml   Net -1850 ml       Admit weight: Weight: 88.9 kg (195 lb 15.8 oz)  Current weight: Weight: 85.5 kg (188 lb 7.9 oz) (23 0600)    Labs:  Recent Labs     23  0140 23  0045 23  0135   WBC 9.6 8.7 8.2   RBC 3.73* 3.29* 3.13*   HEMOGLOBIN 12.2* 10.6* 10.2*   HEMATOCRIT 36.5* 32.0* 30.8*   MCV 97.9* 97.3 98.4*   MCH 32.7 32.2 32.6   MCHC 33.4 33.1 33.1   RDW 44.3 44.3 44.5   PLATELETCT 196 167 175   MPV 10.5 10.5 10.8       Recent Labs     23  0140 23  0510 23  0045 23  0135   SODIUM 139  --  135 136   POTASSIUM 4.4 4.9 4.3 4.1   CHLORIDE 106  --  102 99   CO2 22  --  27 29   GLUCOSE 122*  --  117* 128*   BUN 20  --  22 17   CREATININE 0.70  --  0.95 0.73   CALCIUM 7.9*  --  8.1* 8.3*       Recent Labs     23  1305   APTT 30.9   INR 1.33*         HgbA1c:  5.7    Diabetic Educator Consult:  N\A    Medications:  Scheduled Medications   Medication Dose Frequency    enoxaparin (LOVENOX)  injection  40 mg DAILY AT 1800    Nozin nasal  swab  1 Applicator BID    Pharmacy Consult Request  1 Each PHARMACY TO DOSE    acetaminophen  1,000 mg Q6HRS    senna-docusate  2 Tablet BID    And    polyethylene glycol/lytes  1 Packet DAILY    And    magnesium hydroxide  30 mL DAILY    omeprazole  20 mg DAILY    metoprolol tartrate  25 mg BID    aspirin  81 mg DAILY    clopidogrel  75 mg DAILY    atorvastatin  40 mg QHS        Exam:   Physical Exam  Constitutional:       General: He is not in acute distress.     Appearance: He is well-developed. He is not diaphoretic.   Cardiovascular:      Rate and Rhythm: Normal rate and regular rhythm.      Heart sounds: Normal heart sounds. No murmur heard.     No friction rub. No gallop.   Pulmonary:      Effort: Pulmonary effort is normal. No respiratory distress.      Breath sounds: Examination of the right-lower field reveals decreased breath sounds. Examination of the left-lower field reveals decreased breath sounds. Decreased breath sounds present. No wheezing or rales.   Abdominal:      General: There is no distension.      Palpations: Abdomen is soft.      Tenderness: There is no abdominal tenderness.   Musculoskeletal:      Cervical back: Neck supple.   Skin:     General: Skin is warm and dry.      Comments: Sternum- CDI  SVG site- CDI   Neurological:      Mental Status: He is alert and oriented to person, place, and time.         Cardiac Medications:    ASA - Yes    Plavix - Yes    Post-operative Beta Blockers - Yes    Ace/ARB- No; contraindicated because of Normal EF    Statin - Yes    Aldactone- No; contraindicated because of Normal EF    SGLT2i-  No contraindicated because of No; contraindicated because of Normal EF    Ejection Fraction:  50%    Telemetry:   12/19 SR  12/20 SR  12/21 SR    Assessment/Plan:  POD 1 HOTN- on low dose levo, SR, d/c mediastinal tubes, diurese when able, enc IS/ambulation    POD 2  HDS, SR, neuro intact, wounds intact, abdomen  soft, fluid balance negative, wt below admission wt,  0.5 L NC.  Plan: Keep pacing wires. IS/ambulate. Transfer to tele.     POD 3 HDS, SR- d/c pacer wires, Wean oxygen, wt down and I&O neg, amb/enc IS, planning home in 1-2 days    Disposition:  PT/OT- no needs

## 2023-12-22 LAB
ANION GAP SERPL CALC-SCNC: 10 MMOL/L (ref 7–16)
BUN SERPL-MCNC: 17 MG/DL (ref 8–22)
CALCIUM SERPL-MCNC: 8.4 MG/DL (ref 8.5–10.5)
CHLORIDE SERPL-SCNC: 100 MMOL/L (ref 96–112)
CO2 SERPL-SCNC: 26 MMOL/L (ref 20–33)
CREAT SERPL-MCNC: 0.68 MG/DL (ref 0.5–1.4)
EKG IMPRESSION: NORMAL
EKG IMPRESSION: NORMAL
ERYTHROCYTE [DISTWIDTH] IN BLOOD BY AUTOMATED COUNT: 43.3 FL (ref 35.9–50)
GFR SERPLBLD CREATININE-BSD FMLA CKD-EPI: 101 ML/MIN/1.73 M 2
GLUCOSE SERPL-MCNC: 115 MG/DL (ref 65–99)
HCT VFR BLD AUTO: 29.9 % (ref 42–52)
HGB BLD-MCNC: 10.1 G/DL (ref 14–18)
MCH RBC QN AUTO: 32.5 PG (ref 27–33)
MCHC RBC AUTO-ENTMCNC: 33.8 G/DL (ref 32.3–36.5)
MCV RBC AUTO: 96.1 FL (ref 81.4–97.8)
PLATELET # BLD AUTO: 204 K/UL (ref 164–446)
PMV BLD AUTO: 10.1 FL (ref 9–12.9)
POTASSIUM SERPL-SCNC: 3.8 MMOL/L (ref 3.6–5.5)
RBC # BLD AUTO: 3.11 M/UL (ref 4.7–6.1)
SODIUM SERPL-SCNC: 136 MMOL/L (ref 135–145)
WBC # BLD AUTO: 7.9 K/UL (ref 4.8–10.8)

## 2023-12-22 PROCEDURE — 80048 BASIC METABOLIC PNL TOTAL CA: CPT

## 2023-12-22 PROCEDURE — 700111 HCHG RX REV CODE 636 W/ 250 OVERRIDE (IP): Mod: JZ | Performed by: NURSE PRACTITIONER

## 2023-12-22 PROCEDURE — 700102 HCHG RX REV CODE 250 W/ 637 OVERRIDE(OP): Performed by: NURSE PRACTITIONER

## 2023-12-22 PROCEDURE — A9270 NON-COVERED ITEM OR SERVICE: HCPCS | Performed by: NURSE PRACTITIONER

## 2023-12-22 PROCEDURE — 99024 POSTOP FOLLOW-UP VISIT: CPT | Performed by: NURSE PRACTITIONER

## 2023-12-22 PROCEDURE — 85027 COMPLETE CBC AUTOMATED: CPT

## 2023-12-22 PROCEDURE — 36415 COLL VENOUS BLD VENIPUNCTURE: CPT

## 2023-12-22 PROCEDURE — 93010 ELECTROCARDIOGRAM REPORT: CPT | Performed by: INTERNAL MEDICINE

## 2023-12-22 PROCEDURE — 93005 ELECTROCARDIOGRAM TRACING: CPT | Performed by: THORACIC SURGERY (CARDIOTHORACIC VASCULAR SURGERY)

## 2023-12-22 PROCEDURE — 770020 HCHG ROOM/CARE - TELE (206)

## 2023-12-22 RX ADMIN — ENOXAPARIN SODIUM 40 MG: 100 INJECTION SUBCUTANEOUS at 17:46

## 2023-12-22 RX ADMIN — DOCUSATE SODIUM 50 MG AND SENNOSIDES 8.6 MG 2 TABLET: 8.6; 5 TABLET, FILM COATED ORAL at 17:46

## 2023-12-22 RX ADMIN — Medication 1 APPLICATOR: at 20:06

## 2023-12-22 RX ADMIN — ASPIRIN 81 MG: 81 TABLET, COATED ORAL at 05:50

## 2023-12-22 RX ADMIN — ACETAMINOPHEN 1000 MG: 500 TABLET, FILM COATED ORAL at 17:46

## 2023-12-22 RX ADMIN — ACETAMINOPHEN 1000 MG: 500 TABLET, FILM COATED ORAL at 05:50

## 2023-12-22 RX ADMIN — CLOPIDOGREL BISULFATE 75 MG: 75 TABLET ORAL at 05:50

## 2023-12-22 RX ADMIN — OMEPRAZOLE 20 MG: 20 CAPSULE, DELAYED RELEASE ORAL at 05:50

## 2023-12-22 RX ADMIN — AMIODARONE HYDROCHLORIDE 400 MG: 200 TABLET ORAL at 05:50

## 2023-12-22 RX ADMIN — ACETAMINOPHEN 1000 MG: 500 TABLET, FILM COATED ORAL at 12:35

## 2023-12-22 RX ADMIN — METOPROLOL TARTRATE 25 MG: 25 TABLET, FILM COATED ORAL at 05:51

## 2023-12-22 RX ADMIN — DOCUSATE SODIUM 50 MG AND SENNOSIDES 8.6 MG 2 TABLET: 8.6; 5 TABLET, FILM COATED ORAL at 05:49

## 2023-12-22 RX ADMIN — ACETAMINOPHEN 1000 MG: 500 TABLET, FILM COATED ORAL at 00:52

## 2023-12-22 RX ADMIN — ATORVASTATIN CALCIUM 40 MG: 40 TABLET, FILM COATED ORAL at 20:06

## 2023-12-22 RX ADMIN — AMIODARONE HYDROCHLORIDE 0.5 MG/MIN: 1.8 INJECTION, SOLUTION INTRAVENOUS at 00:54

## 2023-12-22 RX ADMIN — Medication 1 APPLICATOR: at 08:47

## 2023-12-22 RX ADMIN — AMIODARONE HYDROCHLORIDE 400 MG: 200 TABLET ORAL at 17:47

## 2023-12-22 ASSESSMENT — FIBROSIS 4 INDEX: FIB4 SCORE: 0.99

## 2023-12-22 ASSESSMENT — PAIN DESCRIPTION - PAIN TYPE
TYPE: ACUTE PAIN

## 2023-12-22 NOTE — CARE PLAN
The patient is Stable - Low risk of patient condition declining or worsening    Shift Goals  Clinical Goals: Hemodynamic stability, improve IS  Patient Goals: Rest  Family Goals: MARGIE    Progress made toward(s) clinical / shift goals:  Atrial fibrillation, amiodarone gtt running       Problem: Knowledge Deficit - Standard  Goal: Patient and family/care givers will demonstrate understanding of plan of care, disease process/condition, diagnostic tests and medications  Outcome: Progressing     Problem: Pain - Standard  Goal: Alleviation of pain or a reduction in pain to the patient’s comfort goal  Outcome: Progressing     Problem: Skin Integrity  Goal: Skin integrity is maintained or improved  Outcome: Progressing     Problem: Fall Risk  Goal: Patient will remain free from falls  Outcome: Progressing     Problem: Post Op Day 3 CABG/Heart Valve replacement  Goal: Optimal care of the post op CABG/Heart Valve replacement post op day 3  Outcome: Progressing  Intervention: Daily weights in the morning  Intervention: Shower daily and clean incisions twice daily with soap and water  Intervention: Ambulate 4 times daily, increasing the distance each time     Problem: Hemodynamics  Goal: Patient's hemodynamics, fluid balance and neurologic status will be stable or improve  Outcome: Progressing     Problem: Self Care  Goal: Patient will have the ability to perform ADLs independently or with assistance (bathe, groom, dress, toilet and feed)  Outcome: Progressing     Problem: Bowel Elimination - Post Surgical  Goal: Patient will resume regular bowel sounds and function with no discomfort or distention  Outcome: Progressing       Patient is not progressing towards the following goals:

## 2023-12-22 NOTE — PROGRESS NOTES
Assumed care of patient and received report from RN. Tele monitor in place and patient in  afib. VS stable. A&Ox4. Pain 0/10. POC discussed with patient and verbalized understanding. Call light in reach. Fall precautions in place. Bed locked in lowest position. Bed alarm on.

## 2023-12-22 NOTE — PROGRESS NOTES
Cardiovascular Surgery Progress Note    Name: Pa Sinclair  MRN: [id number]  : 1955  Admit Date: 2023  5:23 AM  4 Days Post-Op     Procedure:  Procedure(s) and Anesthesia Type:     * CORONARY ARTERY BYPASS GRAFT X 3, ENDOSCOPIC VEIN HARVEST - General     * ECHOCARDIOGRAM, TRANSESOPHAGEAL, INTRAOPERATIVE - General     * LEFT UPPER LOBE WEDGE RESECTION - General    Vitals:  Vitals:    23 2129 23 2358 23 0500 23 0700   BP: 107/72 96/61 107/64 108/67   Pulse: 90 80 63 (!) 51   Resp: 18 17 16 17   Temp: 37.2 °C (99 °F) 37 °C (98.6 °F) 36.6 °C (97.9 °F)    TempSrc: Temporal Temporal Temporal Temporal   SpO2: 92% 90% 93% 97%   Weight:   85.2 kg (187 lb 13.3 oz)    Height:          Temp (24hrs), Av.9 °C (98.4 °F), Min:36.6 °C (97.9 °F), Max:37.2 °C (99 °F)      Respiratory:    Respiration: 17, Pulse Oximetry: 97 %       Fluids:    Intake/Output Summary (Last 24 hours) at 2023 0953  Last data filed at 2023 2200  Gross per 24 hour   Intake 1407.03 ml   Output 1650 ml   Net -242.97 ml       Admit weight: Weight: 88.9 kg (195 lb 15.8 oz)  Current weight: Weight: 85.2 kg (187 lb 13.3 oz) (23 0500)    Labs:  Recent Labs     23  0045 23  0135 23  0144   WBC 8.7 8.2 7.9   RBC 3.29* 3.13* 3.11*   HEMOGLOBIN 10.6* 10.2* 10.1*   HEMATOCRIT 32.0* 30.8* 29.9*   MCV 97.3 98.4* 96.1   MCH 32.2 32.6 32.5   MCHC 33.1 33.1 33.8   RDW 44.3 44.5 43.3   PLATELETCT 167 175 204   MPV 10.5 10.8 10.1       Recent Labs     23  0045 23  0135 23  1755 23  0144   SODIUM 135 136  --  136   POTASSIUM 4.3 4.1 4.3 3.8   CHLORIDE 102 99  --  100   CO2 27 29  --  26   GLUCOSE 117* 128*  --  115*   BUN 22 17  --  17   CREATININE 0.95 0.73  --  0.68   CALCIUM 8.1* 8.3*  --  8.4*               HgbA1c:  5.7    Diabetic Educator Consult:  N\A    Medications:  Scheduled Medications   Medication Dose Frequency    amiodarone  400 mg BID    enoxaparin (LOVENOX)  injection  40 mg DAILY AT 1800    Nozin nasal  swab  1 Applicator BID    Pharmacy Consult Request  1 Each PHARMACY TO DOSE    acetaminophen  1,000 mg Q6HRS    senna-docusate  2 Tablet BID    And    polyethylene glycol/lytes  1 Packet DAILY    And    magnesium hydroxide  30 mL DAILY    omeprazole  20 mg DAILY    metoprolol tartrate  25 mg BID    aspirin  81 mg DAILY    clopidogrel  75 mg DAILY    atorvastatin  40 mg QHS        Exam:   Physical Exam  Constitutional:       General: He is not in acute distress.     Appearance: He is well-developed. He is not diaphoretic.   Cardiovascular:      Rate and Rhythm: Normal rate and regular rhythm.      Heart sounds: Normal heart sounds. No murmur heard.     No friction rub. No gallop.   Pulmonary:      Effort: Pulmonary effort is normal. No respiratory distress.      Breath sounds: Examination of the right-lower field reveals decreased breath sounds. Examination of the left-lower field reveals decreased breath sounds. Decreased breath sounds present. No wheezing or rales.   Abdominal:      General: There is no distension.      Palpations: Abdomen is soft.      Tenderness: There is no abdominal tenderness.   Musculoskeletal:      Cervical back: Neck supple.   Skin:     General: Skin is warm and dry.      Comments: Sternum- CDI  SVG site- CDI   Neurological:      Mental Status: He is alert and oriented to person, place, and time.         Cardiac Medications:    ASA - Yes    Plavix - Yes    Post-operative Beta Blockers - Yes    Ace/ARB- No; contraindicated because of Normal EF    Statin - Yes    Aldactone- No; contraindicated because of Normal EF    SGLT2i-  No contraindicated because of No; contraindicated because of Normal EF    Ejection Fraction:  50%    Telemetry:   12/19 SR  12/20 SR  12/21 SR  12/22 SR- Afib RVR yesterday  12/23 SR    Assessment/Plan:  POD 1 HOTN- on low dose levo, SR, d/c mediastinal tubes, diurese when able, enc IS/ambulation    POD 2  HDS, SR,  neuro intact, wounds intact, abdomen soft, fluid balance negative, wt below admission wt,  0.5 L NC.  Plan: Keep pacing wires. IS/ambulate. Transfer to tele.     POD 3 HDS, SR- d/c pacer wires, Wean oxygen, wt down and I&O neg, amb/enc IS, planning home in 1-2 days    POD 4 HDS, SR- on amio gtt- d/c, Room air, planning home in am if rhythm is stable    Disposition:  PT/OT- no needs

## 2023-12-22 NOTE — CARE PLAN
The patient is Stable - Low risk of patient condition declining or worsening    Shift Goals  Clinical Goals: POD #3, ambulate, clean incisions, hemodynamic stability, HR control  Patient Goals: sleep  Family Goals: MARGIE    Progress made toward(s) clinical / shift goals:      Problem: Knowledge Deficit - Standard  Goal: Patient and family/care givers will demonstrate understanding of plan of care, disease process/condition, diagnostic tests and medications  Outcome: Progressing  Note: Pt educated on POC and disease processes. Pt verbalized understanding of medication regimen and interventions.     Problem: Post Op Day 3 CABG/Heart Valve replacement  Goal: Optimal care of the post op CABG/Heart Valve replacement post op day 3  Outcome: Progressing  Intervention: Shower daily and clean incisions twice daily with soap and water  Note: Pt showered during day shift, incisions cleansed with soap and water.   Intervention: Ambulate 4 times daily, increasing the distance each time  Note: Pt ambulated 4 times, 500 ft each and tolerates well.

## 2023-12-22 NOTE — DISCHARGE PLANNING
Case Management Discharge Planning     Admission Date: 12/18/2023  GMLOS: 5.8  ALOS: 2     6-Clicks ADL Score:    6-Clicks Mobility Score:          Anticipated Discharge Dispo: Discharge Disposition: Discharged to home/self care (01)  Per chart review pt resides in [place], [place].      Family support:   Name                     Relation                     Phone   Martell Sinclair                     [phone]  Backes, Ambreen         Significant other      [phone]     Current Insurance on file: Medicare (A&B) and [employer].     DME Needed: No     Action(s) Taken: Updated Provider/Nurse on Discharge Plan     Cart review complete; Pt was discussed in IDT rounds today; Pt admitted for a CABG. LSW will continue to follow to assist with any CM needs.      Escalations Completed: None     Medically Clear: No     Next Steps:  f/u with pt and medical team to discuss dc needs and barriers.      Barriers to Discharge: Medical clearance     Is the patient up for discharge tomorrow: No

## 2023-12-22 NOTE — PROGRESS NOTES
0700 - Report received from Suzanna BURROWS at patient's bedside. Patient resting in bed quietly with no complaints at this time. Telemetry monitor intact et functioning. Call light and belongings within reach, safety measures intact, white board updated.     1900 - Reported off to Brenda BURROWS at patient's bedside.

## 2023-12-22 NOTE — PROGRESS NOTES
Monitor Summary:   Rhythm: NSR with 1st degree AVB  Rate: 60 - 73  Measurement: .20/.05/.56  Ectopy: PVCs    12 hour chart check

## 2023-12-22 NOTE — PROGRESS NOTES
Monitor Summary:   Rhythm: ST/afib  Rate:   Measurement: .-/.09/.-  Ectopy: pvc (r), converted to afib 1600

## 2023-12-22 NOTE — DISCHARGE PLANNING
Discharge Appointments/outpatient referrals/ and  set up:    Cardiac surgery follow up appointment made for 4-5 weeks out    [facility] cardiology f/u appointment for MD or APRN within 3-4 weeks is in place.    Aortic surveillance program/vascular medicine referral not needed, orders not placed.    Anticoagulation referral/ coumadin clinic referral not needed and orders not placed .    INR draws are not indicated for CABG procedure and Plavix.    PT/OT notes do not recommend placement or equipment needs.

## 2023-12-23 VITALS
TEMPERATURE: 98.1 F | HEIGHT: 71 IN | OXYGEN SATURATION: 94 % | WEIGHT: 189.6 LBS | RESPIRATION RATE: 17 BRPM | BODY MASS INDEX: 26.54 KG/M2 | SYSTOLIC BLOOD PRESSURE: 106 MMHG | HEART RATE: 57 BPM | DIASTOLIC BLOOD PRESSURE: 65 MMHG

## 2023-12-23 LAB
ANION GAP SERPL CALC-SCNC: 12 MMOL/L (ref 7–16)
BUN SERPL-MCNC: 20 MG/DL (ref 8–22)
CALCIUM SERPL-MCNC: 8.6 MG/DL (ref 8.5–10.5)
CHLORIDE SERPL-SCNC: 99 MMOL/L (ref 96–112)
CO2 SERPL-SCNC: 24 MMOL/L (ref 20–33)
CREAT SERPL-MCNC: 0.65 MG/DL (ref 0.5–1.4)
ERYTHROCYTE [DISTWIDTH] IN BLOOD BY AUTOMATED COUNT: 42.8 FL (ref 35.9–50)
GFR SERPLBLD CREATININE-BSD FMLA CKD-EPI: 102 ML/MIN/1.73 M 2
GLUCOSE SERPL-MCNC: 99 MG/DL (ref 65–99)
HCT VFR BLD AUTO: 30.1 % (ref 42–52)
HGB BLD-MCNC: 10.1 G/DL (ref 14–18)
MCH RBC QN AUTO: 32 PG (ref 27–33)
MCHC RBC AUTO-ENTMCNC: 33.6 G/DL (ref 32.3–36.5)
MCV RBC AUTO: 95.3 FL (ref 81.4–97.8)
PLATELET # BLD AUTO: 255 K/UL (ref 164–446)
PMV BLD AUTO: 11 FL (ref 9–12.9)
POTASSIUM SERPL-SCNC: 3.8 MMOL/L (ref 3.6–5.5)
RBC # BLD AUTO: 3.16 M/UL (ref 4.7–6.1)
SODIUM SERPL-SCNC: 135 MMOL/L (ref 135–145)
WBC # BLD AUTO: 7.2 K/UL (ref 4.8–10.8)

## 2023-12-23 PROCEDURE — 700102 HCHG RX REV CODE 250 W/ 637 OVERRIDE(OP): Performed by: NURSE PRACTITIONER

## 2023-12-23 PROCEDURE — 99024 POSTOP FOLLOW-UP VISIT: CPT | Performed by: NURSE PRACTITIONER

## 2023-12-23 PROCEDURE — 85027 COMPLETE CBC AUTOMATED: CPT

## 2023-12-23 PROCEDURE — 80048 BASIC METABOLIC PNL TOTAL CA: CPT

## 2023-12-23 PROCEDURE — A9270 NON-COVERED ITEM OR SERVICE: HCPCS | Performed by: NURSE PRACTITIONER

## 2023-12-23 PROCEDURE — 36415 COLL VENOUS BLD VENIPUNCTURE: CPT

## 2023-12-23 RX ORDER — AMIODARONE HYDROCHLORIDE 200 MG/1
400 TABLET ORAL 2 TIMES DAILY
Qty: 56 TABLET | Refills: 1 | Status: SHIPPED | OUTPATIENT
Start: 2023-12-23 | End: 2024-01-31

## 2023-12-23 RX ORDER — ACETAMINOPHEN 500 MG
1000 TABLET ORAL EVERY 6 HOURS PRN
Qty: 30 TABLET | Refills: 0 | COMMUNITY
Start: 2023-12-28 | End: 2024-01-22

## 2023-12-23 RX ORDER — OMEPRAZOLE 20 MG/1
20 CAPSULE, DELAYED RELEASE ORAL DAILY
Qty: 30 CAPSULE | Refills: 2 | Status: SHIPPED
Start: 2023-12-24 | End: 2024-01-22

## 2023-12-23 RX ADMIN — DOCUSATE SODIUM 50 MG AND SENNOSIDES 8.6 MG 2 TABLET: 8.6; 5 TABLET, FILM COATED ORAL at 06:14

## 2023-12-23 RX ADMIN — ACETAMINOPHEN 1000 MG: 500 TABLET, FILM COATED ORAL at 06:13

## 2023-12-23 RX ADMIN — OMEPRAZOLE 20 MG: 20 CAPSULE, DELAYED RELEASE ORAL at 06:14

## 2023-12-23 RX ADMIN — METOPROLOL TARTRATE 25 MG: 25 TABLET, FILM COATED ORAL at 06:14

## 2023-12-23 RX ADMIN — CLOPIDOGREL BISULFATE 75 MG: 75 TABLET ORAL at 06:14

## 2023-12-23 RX ADMIN — ASPIRIN 81 MG: 81 TABLET, COATED ORAL at 06:13

## 2023-12-23 RX ADMIN — AMIODARONE HYDROCHLORIDE 400 MG: 200 TABLET ORAL at 06:14

## 2023-12-23 RX ADMIN — ACETAMINOPHEN 1000 MG: 500 TABLET, FILM COATED ORAL at 00:11

## 2023-12-23 RX ADMIN — Medication 1 APPLICATOR: at 10:22

## 2023-12-23 ASSESSMENT — FIBROSIS 4 INDEX: FIB4 SCORE: 0.79

## 2023-12-23 ASSESSMENT — PAIN DESCRIPTION - PAIN TYPE: TYPE: ACUTE PAIN

## 2023-12-23 NOTE — CARE PLAN
The patient is Stable - Low risk of patient condition declining or worsening    Shift Goals  Clinical Goals: POD #4, ambulation, hemodynamic stability, tele  Patient Goals: discharge  Family Goals: MARGIE    Progress made toward(s) clinical / shift goals:      Problem: Knowledge Deficit - Standard  Goal: Patient and family/care givers will demonstrate understanding of plan of care, disease process/condition, diagnostic tests and medications  Outcome: Progressing  Note: Pt educated on POC and disease processes. Pt verbalized understanding of medication regimen and interventions.     Problem: Post Op Day 4 CABG/Heart Valve Replacement  Goal: Optimal care of the Post Op CABG/Heart Valve replacement Post Op Day 4  Outcome: Progressing  Note: Pt ambulated x4. Incisions cleansed. IS performed every hour awake. Pt encouraged to ambulate and perform ADLs with minimal assisstance. Pt showered during day shift.

## 2023-12-23 NOTE — PROGRESS NOTES
Monitor Summary:   Rhythm: SB/NSR with 1st degree AVB  Rate: 54 - 63  Measurement: .20/.10/.43  Ectopy: Rare PVC    12 hour chart check

## 2023-12-23 NOTE — PROGRESS NOTES
0700 - Report received from Brenda BURROWS at patient's bedside. Patient resting in bed quietly with no complaints at this time. Telemetry monitor intact et functioning. Call light and belongings within reach, safety measures intact, white board updated.     1100 - All discharge instructions reviewed with patient and patient's family. IV removed with immediate hemostasis. Telemetry removed and returned to desk. Patient able to ambulate easily to main entrance to private vehicle for transport home.

## 2023-12-23 NOTE — DISCHARGE INSTRUCTIONS
DIVISION OF CARDIAC SURGERY   DISCHARGE INSTRUCTIONS    Activity:    NO driving for 4 weeks after surgery. You may ride as a passenger.  NO lifting, pushing, or pulling more than 10 pounds for 6 weeks.  For the next 6 weeks, keep your elbows close to your body and move within a pain-free motion when lifting, pushing or pulling.  Do not stretch both arms backwards at the same time.    Walk at least 4 times per day, there is no maximum. The goal is to increase your distance over time.  Continue using incentive spirometer for 2 weeks or until your baseline volume is reached.  If you are going home on oxygen and you were not on oxygen prior to surgery, keep using until you are oxygen free.  Weigh yourself daily.  Call your Cardiologist for a weight gain of 3 or more pounds in 1 day or more than 5 pounds in 7 days.  Take all of your medications as prescribed. Do not use a pill box for the first month at home. If you have questions, please call your nurse navigator at [phone].  Continue to wear the PAUL (compression) stockings for 2-4 weeks or until all swelling is gone. You may take them off when you are in bed or when your legs are elevated.    Incision Care:    Make sure to clean your incision(s) TWICE DAILY.  Once by showering AND once using the no rinse Foam cleanser provided in the hospital.  During the shower, cleanse the incision(s) with a perfume and dye free soap (Dial, Dove, [place])  Use gentle pressure and rub up and down over incision with your hands or a washcloth. Rinse off and pat incision(s) dry with clean towel.  Keep the incision open to air. No creams or lotions on your incision(s). No baths.  If there is any increased redness or swelling, separation of the incision line, or thick drainage from any of your incisions, call the Cardiac Surgeons ([phone]).      General Instructions:    You have been referred to Cardiac Rehab.  You can start Cardiac Rehab 30 days after surgery.  If you do  not have an appointment at the time of discharge call [phone] to schedule an appointment.  Your Primary Care Doctor typically handles home oxygen. Oxygen may be stopped when your oxygen level is consistently greater than 90.  Check with your Primary Care Doctor if you are unsure.  Take all of your medications (including pain medications) as prescribed.  Taking medications other than prescribed can result in serious injury.    For Patients Discharged with Narcotic Pain Medication:     If a refill is needed, understand that only 1 refill will be provided and you must come to the Cardiac Surgeons’ office for an appointment (72 hours’ notice is required to schedule and there are no weekend appointments).  If the pain medications you are discharged on are not working, you will need to bring your remaining prescription into the office in order to receive a new prescription.  If you were taking narcotics prior to your heart surgery, the Cardiac Surgeons will provide you with one prescription and additional medications will need to be provided by your pain management doctor.  Do not drink alcohol while taking narcotics.  Lost or stolen medications will not be refilled.  If medications are stolen, report to law enforcement.    Contact Cardiac Surgery at [phone] if you have any questions.

## 2023-12-23 NOTE — CARE PLAN
The patient is Stable - Low risk of patient condition declining or worsening    Shift Goals  Clinical Goals: POD #3, ambulate, clean incisions, hemodynamic stability, HR control  Patient Goals: sleep  Family Goals: MARGIE    Progress made toward(s) clinical / shift goals:  Plan for DC tomorrow AM        Problem: Knowledge Deficit - Standard  Goal: Patient and family/care givers will demonstrate understanding of plan of care, disease process/condition, diagnostic tests and medications  Outcome: Progressing     Problem: Pain - Standard  Goal: Alleviation of pain or a reduction in pain to the patient’s comfort goal  Outcome: Progressing  Flowsheets (Taken 12/22/2023 1643)  Non Verbal Scale: Calm  Pain Rating Scale (NPRS): 0     Problem: Skin Integrity  Goal: Skin integrity is maintained or improved  Outcome: Progressing     Problem: Post Op Day 4 CABG/Heart Valve Replacement  Goal: Optimal care of the Post Op CABG/Heart Valve replacement Post Op Day 4  Outcome: Progressing  Intervention: Shower daily and clean incisions twice daily with soap and water  Intervention: Ambulate 4 times daily, increasing the distance each time     Problem: Hemodynamics  Goal: Patient's hemodynamics, fluid balance and neurologic status will be stable or improve  Outcome: Progressing     Problem: Self Care  Goal: Patient will have the ability to perform ADLs independently or with assistance (bathe, groom, dress, toilet and feed)  Outcome: Progressing     Problem: Bowel Elimination - Post Surgical  Goal: Patient will resume regular bowel sounds and function with no discomfort or distention  Outcome: Progressing     Problem: Day of surgery post CABG/Heart valve replacement  Goal: Stabilization in immediate post op period  Outcome: Met     Problem: Post Op Day 1 CABG/Heart Valve Replacement  Goal: Optimal care of the post op CABG/heart valve replacement Post Op Day 1  Outcome: Met     Problem: Post op day 2 CABG/Heart Valve Replacement  Goal:  Optimal care of the post op CABG/heart valve replacement post op day 2  Outcome: Met     Problem: Post Op Day 3 CABG/Heart Valve replacement  Goal: Optimal care of the post op CABG/Heart Valve replacement post op day 3  Outcome: Met  Intervention: Shower daily and clean incisions twice daily with soap and water  Intervention: Ambulate 4 times daily, increasing the distance each time       Patient is not progressing towards the following goals:

## 2023-12-23 NOTE — DISCHARGE SUMMARY
DISCHARGE SUMMARY    ADMISSION DATE: 12/18/2023    DISCHARGE DATE: 12/23/2023    ADMITTING DIAGNOSES: Multivessel coronary artery disease, stable angina pectoris, hypertension, hyperlipidemia, ascending aortic ectasia, COPD      DISCHARGE DIAGNOSES: Multivessel coronary artery disease, stable angina pectoris, hypertension, hyperlipidemia, ascending aortic ectasia, COPD      PROCEDURES PERFORMED:   Dr. Bearden 12/18/2023:  CORONARY ARTERY BYPASS GRAFT X 3 WITH SKELETONIZED CALVERT TO LAD, ANAMARIA Y GRAFT TO OM1 AND 2,ENDOSCOPIC VEIN HARVEST of left greater saphenous vein  LEFT UPPER LOBE WEDGE RESECTION ECHOCARDIOGRAM, TRANSESOPHAGEAL, INTRAOPERATIVE     HISTORY OF PRESENT ILLNESS:  The patient is a 68 y.o. male with history of hypertension, hyperlipidemia, and coronary artery disease. He had chest pain with exertion for the last month. It is relieved with rest. He has had vein stripping in the past for varicose veins.  He had cardiac catheterization that showed multi-vessel disease and he was set-up for elective surgery.    HOSPITAL COURSE:   POD 1 HOTN- on low dose levo, SR, d/c mediastinal tubes, diurese when able, enc IS/ambulation     POD 2  HDS, SR, neuro intact, wounds intact, abdomen soft, fluid balance negative, wt below admission wt,  0.5 L NC.  Plan: Keep pacing wires. IS/ambulate. Transfer to tele.      POD 3 HDS, SR- d/c pacer wires, Wean oxygen, wt down and I&O neg, amb/enc IS, planning home in 1-2 days     POD 4 HDS, SR- on amio gtt- d/c, Room air, planning home in am if rhythm is stable    POD 5 HDS, SR- on PO amio, Room air, ambulating, home today    TELEMETRY:  12/19 SR  12/20 SR  12/21 SR  12/22 SR- Afib RVR yesterday  12/23 SR    RECENT LABS:     Lab Results   Component Value Date/Time    SODIUM 135 12/23/2023 01:35 AM    POTASSIUM 3.8 12/23/2023 01:35 AM    CHLORIDE 99 12/23/2023 01:35 AM    CO2 24 12/23/2023 01:35 AM    GLUCOSE 99 12/23/2023 01:35 AM    BUN 20 12/23/2023 01:35 AM    CREATININE  0.65 12/23/2023 01:35 AM    BUNCREATRAT 17.8 11/20/2023 10:04 AM      Lab Results   Component Value Date/Time    WBC 7.2 12/23/2023 01:35 AM    RBC 3.16 (L) 12/23/2023 01:35 AM    HEMOGLOBIN 10.1 (L) 12/23/2023 01:35 AM    HEMATOCRIT 30.1 (L) 12/23/2023 01:35 AM    MCV 95.3 12/23/2023 01:35 AM    MCH 32.0 12/23/2023 01:35 AM    MCHC 33.6 12/23/2023 01:35 AM    MPV 11.0 12/23/2023 01:35 AM    NEUTSPOLYS 60.30 12/15/2023 09:51 AM    LYMPHOCYTES 20.30 (L) 12/15/2023 09:51 AM    MONOCYTES 9.90 12/15/2023 09:51 AM    EOSINOPHILS 7.40 (H) 12/15/2023 09:51 AM    BASOPHILS 1.90 (H) 12/15/2023 09:51 AM      Lab Results   Component Value Date/Time    PROTHROMBTM 16.6 (H) 12/18/2023 01:05 PM    INR 1.33 (H) 12/18/2023 01:05 PM        Fluids:    Intake/Output Summary (Last 24 hours) at 12/23/2023 0740  Last data filed at 12/22/2023 2000  Gross per 24 hour   Intake 1922.92 ml   Output --   Net 1922.92 ml     Admit weight: Weight: 88.9 kg (195 lb 15.8 oz)  Current weight: Weight: 86 kg (189 lb 9.5 oz) (12/23/23 0604)    ALLERGIES:     Patient has no known allergies.    EJECTION FRACTION:  50%    CARDIAC MEDICATIONS:    ASA - Yes    Plavix - Yes    Post-operative Beta Blockers - Yes    Ace/ARB- No; contraindicated because of Normal EF    Statin - Yes    Aldactone- No; contraindicated because of Normal EF    SGLT2i-  No contraindicated because of No; contraindicated because of Normal EF    DISCHARGE MEDICATIONS:      Medication List        START taking these medications        Instructions   acetaminophen 500 MG Tabs  Start taking on: December 28, 2023  Commonly known as: Tylenol   Take 2 Tablets by mouth every 6 hours as needed for Mild Pain.  Dose: 1,000 mg     amiodarone 200 MG Tabs  Commonly known as: Cordarone   Take 2 Tablets by mouth 2 times a day. x 7 days, Then Take Two Tablets daily for 7 days, Then Take 1 Tablet Daily.  Dose: 400 mg     metoprolol tartrate 25 MG Tabs  Commonly known as: Lopressor   Take 1 Tablet by mouth  2 times a day.  Dose: 25 mg     omeprazole 20 MG delayed-release capsule  Start taking on: December 24, 2023  Commonly known as: PriLOSEC   Take 1 Capsule by mouth every day.  Dose: 20 mg            CONTINUE taking these medications        Instructions   aspirin 81 MG EC tablet   Take 81 mg by mouth every day.  Dose: 81 mg     atorvastatin 40 MG Tabs  Commonly known as: Lipitor   Take 40 mg by mouth every day.  Dose: 40 mg     clopidogrel 75 MG Tabs  Commonly known as: Plavix   Take 75 mg by mouth every day.  Dose: 75 mg            STOP taking these medications      isosorbide mononitrate SR 30 MG Tb24  Commonly known as: Imdur            DIET:   Cardiac diet    DISCHARGE INSTRUCTIONS DISCUSSED WITH THE PATIENT:      1. NO driving for 4 weeks after surgery. You may ride as a passenger.  2. NO lifting of any item over 10 lbs (e.g. gallon of milk) for 6 weeks after surgery.  3. DO walk as much as possible! Walk a minimum of once a day. Depending on your fatigue and comfort level, you may walk as much as you wish. There is no maximum.  4. Other physical activities (sex, housework, gardening, etc.) are OK after 4 weeks   5. Continue using incentive spirometer for 2 weeks, especially if going home on oxygen.    Incision Care:  1. SHOWER ONLY - no baths. Clean incision daily with plain Ivory ® soap or any other dye or perfume free soap. Then pat incision dry with clean towel. Avoid creams or lotions on the incision(s).  a. If there is any increase in redness or swelling, or separation of the incision line, or thick drainage* from any of the incisions, call right away  * Clear, thin drainage is not abnormal especially from the leg incision and/or                         chest tube sites.  2. Continue to wear your PAUL Stockings for 4 weeks. You may take off the stockings when in bed or when the legs are elevated.    Patient instructed to call [facility] cardiac surgery at [phone]  if any increased shortness of breath,  uncontrolled pain, weight gain greater than 3 pounds in 1 day or 5 pounds in 1 week, SBP >140, HR <60 or redness swelling or drainage of incisions.      FOLLOW-UP:   Future Appointments   Date Time Provider Department Center   1/22/2024 12:30 PM CT RESOURCE PROVIDER CT None

## 2023-12-23 NOTE — PROGRESS NOTES
0700 - Report received from Brenda RN at patient's bedside. Patient resting in bed quietly with no complaints at this time. Telemetry monitor intact et functioning. Call light and belongings within reach, safety measures intact, white board updated.     1915 - Reported off to Brenda RN at patient's bedside.

## 2023-12-23 NOTE — CARE PLAN
"The patient is Stable - Low risk of patient condition declining or worsening    Shift Goals  Clinical Goals: POD #4, ambulation, hemodynamic stability, tele  Patient Goals: discharge  Family Goals: MARGIE    Progress made toward(s) clinical / shift goals:  DC home      Problem: Knowledge Deficit - Standard  Goal: Patient and family/care givers will demonstrate understanding of plan of care, disease process/condition, diagnostic tests and medications  Outcome: Progressing     Problem: Pain - Standard  Goal: Alleviation of pain or a reduction in pain to the patient’s comfort goal  Outcome: Progressing  Flowsheets  Taken 12/23/2023 0731 by Deidra Camacho, R.N.  Non Verbal Scale: Calm  Taken 12/22/2023 2200 by Brenda Morrow RANDRE  Pain Rating Scale (NPRS): 0     Problem: Skin Integrity  Goal: Skin integrity is maintained or improved  Outcome: Progressing     Problem: Fall Risk  Goal: Patient will remain free from falls  Outcome: Progressing     Problem: Post Op Day 5 CABG/Heart Valve Replacement  Goal: Optimal care of the Post Op CABG/Heart Valve replacement Post Op Day 5  Outcome: Progressing  Intervention: Complete \"Home O2 Assessment' in vitals flowsheets if unable to wean off O2  Intervention: Discharge Education  Intervention: Add special instructions for cardiac surgery discharge instructions to after visit summary and review with patient     Problem: Hemodynamics  Goal: Patient's hemodynamics, fluid balance and neurologic status will be stable or improve  Outcome: Progressing     Problem: Self Care  Goal: Patient will have the ability to perform ADLs independently or with assistance (bathe, groom, dress, toilet and feed)  Outcome: Progressing     Problem: Bowel Elimination - Post Surgical  Goal: Patient will resume regular bowel sounds and function with no discomfort or distention  Outcome: Progressing     Problem: Post Op Day 4 CABG/Heart Valve Replacement  Goal: Optimal care of the Post Op CABG/Heart Valve " replacement Post Op Day 4  Outcome: Met       Patient is not progressing towards the following goals:       No

## 2023-12-28 ENCOUNTER — TELEPHONE (OUTPATIENT)
Dept: CARDIOTHORACIC SURGERY | Facility: MEDICAL CENTER | Age: 68
End: 2023-12-28
Payer: MEDICARE

## 2023-12-28 NOTE — TELEPHONE ENCOUNTER
Hospital follow up call    he is showering everyday or every other day.    he states that all incisions are healing well and approximated with no s/s of infection (redness, puss, fever, purulent drainage, or tenderness). He did report a small amount of clear drainage from the sternal incision but it has stopped as of 3 days ago.    he states that the post op pain is well controlled.  Narcotics are not being utilized. Tylenol is occasionally being utilized.    he is using the IS; volume is improved. Current volume is 2000 ml.    he is walking everyday, distance is increased from the hospital.    he is obtaining daily weights. Current weight is 187 lbs which is the same as the first home weight of 187 lbs. he is not wearing the compression/PAUL hose. he no LE edema.    he is taking all prescribed meds as directed.  he had medication questions on tylenol vs ibuprofen which was answered, no other medication questions.    he is taking vitals (HR and BP).    BP's: 120's to 130's / 70's to 80's  HR's: 60's     he has had a bowel movement since discharge.    he has no additional questions at this time. Follow up education was not needed on anything. Follow up appointments were reviewed and I ensured they have my number if issues or concerns arise.      Follow up call time was 15 minutes.

## 2024-01-08 ENCOUNTER — TELEPHONE (OUTPATIENT)
Dept: CARDIOTHORACIC SURGERY | Facility: MEDICAL CENTER | Age: 69
End: 2024-01-08
Payer: MEDICARE

## 2024-01-08 RX ORDER — CLOPIDOGREL BISULFATE 75 MG/1
75 TABLET ORAL DAILY
Qty: 30 TABLET | Refills: 2 | Status: SHIPPED
Start: 2024-01-08 | End: 2024-01-22

## 2024-01-17 NOTE — PROGRESS NOTES
"CHIEF COMPLAINT: Post-op visit     PROCEDURE:   Dr. Bearden 12/18/2023:  CORONARY ARTERY BYPASS GRAFT X 3 WITH SKELETONIZED CALVERT TO LAD, ANAMARIA Y GRAFT TO OM1 AND 2,ENDOSCOPIC VEIN HARVEST of left greater saphenous vein  LEFT UPPER LOBE WEDGE RESECTION ECHOCARDIOGRAM, TRANSESOPHAGEAL, INTRAOPERATIVE        HPI: Doing well. Having some pins and needles/sensitivities around incision. He is staying active and walking about a mile a day.     /82 (BP Location: Left arm, Patient Position: Sitting, BP Cuff Size: Adult)   Pulse (!) 59   Ht 1.778 m (5' 10\")   Wt 84.4 kg (186 lb)   SpO2 97%     PHYSICAL EXAM:  Physical Exam  HENT:      Head: Normocephalic.   Cardiovascular:      Rate and Rhythm: Normal rate and regular rhythm.   Pulmonary:      Effort: Pulmonary effort is normal. No respiratory distress.      Breath sounds: Normal breath sounds.   Abdominal:      Palpations: Abdomen is soft.   Skin:     General: Skin is warm and dry.      Comments: Sternum Stable  Sternal Incision- C/D/I   Neurological:      Mental Status: He is alert and oriented to person, place, and time.   Psychiatric:         Mood and Affect: Mood and affect normal.         Cognition and Memory: Memory normal.         Judgment: Judgment normal.          PLAN:   Discussed sternal precautions.   Discussed exercise limitations    Continue plavix for 3 months or per your Cardiologist's recommendations, cardiology stopped this at his last visit.  We reviewed post operative sternal precautions, weight limits and driving precautions moving forward.  We reviewed SBE prophylaxis.      Overall, we are very pleased with the patient’s progress and we have instructed the patient to follow-up with us in the future should they have any concerns related to their surgery. Otherwise, we will see the patient on a PRN basis. The patient will continue to follow-up with their Cardiologist and PCP.  The patient has been informed that any further prescription refills " should be done through their primary care physician and/or cardiologist.  They acknowledged understanding.  Thank you for allowing us to participate in the care of this very pleasant patient and please let us know if there is any way we may be of further assistance.

## 2024-01-22 ENCOUNTER — OFFICE VISIT (OUTPATIENT)
Dept: CARDIOTHORACIC SURGERY | Facility: MEDICAL CENTER | Age: 69
End: 2024-01-22
Payer: MEDICARE

## 2024-01-22 VITALS
HEART RATE: 59 BPM | DIASTOLIC BLOOD PRESSURE: 82 MMHG | WEIGHT: 186 LBS | BODY MASS INDEX: 26.63 KG/M2 | SYSTOLIC BLOOD PRESSURE: 124 MMHG | HEIGHT: 70 IN | OXYGEN SATURATION: 97 %

## 2024-01-22 DIAGNOSIS — Z09 POSTOP CHECK: ICD-10-CM

## 2024-01-22 PROCEDURE — 3074F SYST BP LT 130 MM HG: CPT | Performed by: NURSE PRACTITIONER

## 2024-01-22 PROCEDURE — 3079F DIAST BP 80-89 MM HG: CPT | Performed by: NURSE PRACTITIONER

## 2024-01-22 PROCEDURE — 99024 POSTOP FOLLOW-UP VISIT: CPT | Performed by: NURSE PRACTITIONER

## 2024-01-22 ASSESSMENT — FIBROSIS 4 INDEX: FIB4 SCORE: 0.79

## (undated) DEVICE — BLADE STERNUM SAW SURGICAL 32.0 X 6.4 MM STERILE (1/EA)

## (undated) DEVICE — BLADE SURGICAL #15 - (50/BX 3BX/CA)

## (undated) DEVICE — GLOVE BIOGEL INDICATOR SZ 9 SURGICAL PF LTX - (160/CA)

## (undated) DEVICE — SENSOR OXIMETER ADULT SPO2 RD SET (20EA/BX)

## (undated) DEVICE — GLOVE BIOGEL INDICATOR SZ 8.5 SURGICAL PF LTX - (50/BX 4BX/CA)

## (undated) DEVICE — TOWEL STOP TIMEOUT SAFETY FLAG (40EA/CA)

## (undated) DEVICE — SYS DLV COST CLS RM TEMP - INJECTATE (CO-SET II) (10EA/CA)

## (undated) DEVICE — SET FLUID WARMING STANDARD FLOW - (10/CA)

## (undated) DEVICE — GLOVE BIOGEL SZ 8.5 SURGICAL PF LTX - (50PR/BX 4BX/CA)

## (undated) DEVICE — MICRODRIP PRIMARY VENTED 60 (48EA/CA) THIS WAS PART #2C8428 WHICH WAS DISCONTINUED

## (undated) DEVICE — RETRACTOR OFF PUMP OCTO ONLY - 10/BX

## (undated) DEVICE — TRANSDUCER BIFURCATED MONITORING KIT (10EA/CA)

## (undated) DEVICE — TRAY SURESTEP FOLEY TEMP SENSING 16FR (10EA/CA) ORDER  #18764 FOR TEMP FOLEY ONLY

## (undated) DEVICE — GOWN WARMING STANDARD FLEX - (30/CA)

## (undated) DEVICE — SUTURE 4-0 PROLENE RB-1 D/A 36 (36PK/BX)"

## (undated) DEVICE — SUTURE OHS

## (undated) DEVICE — CORETEMP DRAPE FORM-FITTED EASY DROPANDGO DRAPE FOR USE ON THE CORETEMP FLUID MANAGEMENT 56IN X 56IN

## (undated) DEVICE — TRAY MULTI-LUMEN 7FR PRESSURE W/MAX BARRIER AND BIOPATCH - (5/CA)

## (undated) DEVICE — TOWELS CLOTH SURGICAL - (4/PK 20PK/CA)

## (undated) DEVICE — TUBING CLEARLINK DUO-VENT - C-FLO (48EA/CA)

## (undated) DEVICE — SET CATHETER CENTRAL VENOUS 5X15 ORDER BY THE EACH

## (undated) DEVICE — SODIUM CHL. INJ. 0.9% 500ML (24EA/CA 50CA/PF)

## (undated) DEVICE — SYSTEM CHEST DRAIN ADULT/PEDS W/AUTO TRANSFUSION CAPABILITY SAHARA (6EA/CA)

## (undated) DEVICE — BLOWER/MISTER (5EA/PK)

## (undated) DEVICE — SODIUM CHL IRRIGATION 0.9% 1000ML (12EA/CA)

## (undated) DEVICE — TUBING PRSS MNTR 72IN M/ M LL - (25/BX) MONIT. LINE W/MALE L/L

## (undated) DEVICE — SUTURE 1 SILK 2 X 60 (36PK/BX)

## (undated) DEVICE — BAG RESUSCITATION DISPOSABLE - WITH MASK (10 EA/CA)

## (undated) DEVICE — CHLORAPREP 26 ML APPLICATOR - ORANGE TINT(25/CA)

## (undated) DEVICE — GLOVE BIOGEL PI INDICATOR SZ 6.0 SURGICAL PF LF -(200PR/CA)

## (undated) DEVICE — TUBE E-T HI-LO CUFF 8.5MM (10EA/PK)

## (undated) DEVICE — LACTATED RINGERS INJ 1000 ML - (14EA/CA 60CA/PF)

## (undated) DEVICE — ELECTRODE DUAL RETURN W/ CORD - (50/PK)

## (undated) DEVICE — STAPLE 45MM BLUE 3.5MM ECHELON (12EA/BX)

## (undated) DEVICE — SUTURE 2-0 VICRYL PLUS CT-1 36 (36PK/BX)"

## (undated) DEVICE — GOWN SURGEONS X-LARGE - DISP. (30/CA)

## (undated) DEVICE — CONTAINER SPECIMEN BAG OR - STERILE 4 OZ W/LID (100EA/CA)

## (undated) DEVICE — SUCTION INSTRUMENT YANKAUER BULBOUS TIP W/O VENT (50EA/CA)

## (undated) DEVICE — TUBE CHEST 32FR. STRAIGHT - (10EA/CA)

## (undated) DEVICE — SET BIFURCATED BLOOD - (48EA/CS)

## (undated) DEVICE — SENSOR CEREBRAL AND SOMATIC MONITORING (20/CA)

## (undated) DEVICE — GLOVE BIOGEL PI ORTHO SZ 6 SURGICAL PF LF (40PR/BX)

## (undated) DEVICE — SUTURE 3-0 SILK SH C/R 18 IN - (12/BX)

## (undated) DEVICE — STOPCOCK MALE 4-WAY - (50/CA)

## (undated) DEVICE — SUTURE 6-0 PROLENE RB-2 D/A 30 (36PK/BX)"

## (undated) DEVICE — SUTURE 7-0 PROLENE 24BV175-6 - EP8735H (36/BX)"

## (undated) DEVICE — SUTURE 5 SURGICAL STEEL V-40 - (12/BX) CCS CURRENT

## (undated) DEVICE — TUBING INSUFFLATION - (10/BX)

## (undated) DEVICE — SUTURE 4-0 30CM STRATAFIX SPIRAL PS-2 (12EA/BX)

## (undated) DEVICE — PACK VEIN - (19/CA)

## (undated) DEVICE — BLADE BEAVER 6900 MINI SHARP ALL AROUND (20/CA)

## (undated) DEVICE — PAD LAP STERILE 18 X 18 - (5/PK 40PK/CA)

## (undated) DEVICE — SUTURE 4-0 PROLENE V-7 D/A (36PK/BX)

## (undated) DEVICE — PACK CV DRAPING/BASIN 2PART - (1/CA)

## (undated) DEVICE — SPRING BULLDOG 1/2 FORCE BLUE - (10/BX)

## (undated) DEVICE — TUBE CHEST 32FR. RIGHT ANGLED (10EA/CA)

## (undated) DEVICE — SEALANT TISSUE CLOSURE KIT FIBIRIN VISTASEAL 10ML (1EA/BX)

## (undated) DEVICE — COVER LIGHT HANDLE ALC PLUS DISP (18EA/BX)

## (undated) DEVICE — KIT ENDOHARVEST SYSTEM 8 - MUST ORDER 5 AT A TIME

## (undated) DEVICE — SET EXTENSION WITH 2 PORTS (48EA/CA) ***PART #2C8610 IS A SUBSTITUTE*****

## (undated) DEVICE — KIT TOURNIQUET DLP (40EA/PK)

## (undated) DEVICE — BOVIE BLADE COATED &INSULATED - 25/PK

## (undated) DEVICE — INSERT STEALTH #5 - (10/BX)

## (undated) DEVICE — FIBRILLAR SURGICEL 4X4 - 10/CA

## (undated) DEVICE — CANISTER SUCTION 3000ML MECHANICAL FILTER AUTO SHUTOFF MEDI-VAC NONSTERILE LF DISP  (40EA/CA)

## (undated) DEVICE — CATHETER THERMALDILUTION SWAN - (5EA/CA)

## (undated) DEVICE — WIRE TEMPORARY CARDIAC PACING ATRIUM SS 24 (12PK/BX)"

## (undated) DEVICE — CAMERA COVER

## (undated) DEVICE — STAPLER SKIN DISP - (6/BX 10BX/CA) VISISTAT

## (undated) DEVICE — KIT RADIAL ARTERY 20GA W/MAX BARRIER AND BIOPATCH  (5EA/CA) #10740 IS FOR THE SET RADIAL ARTERIAL

## (undated) DEVICE — MARKER GRAFT AC VEIN DISK SHAPED (10EA/BX): Type: IMPLANTABLE DEVICE | Status: NON-FUNCTIONAL

## (undated) DEVICE — SUTURE 3-0 PROLENE SH 36 (36PK/BX)"

## (undated) DEVICE — SET LEADWIRE 5 LEAD BEDSIDE DISPOSABLE ECG (1SET OF 5/EA)

## (undated) DEVICE — SYRINGE NON SAFETY 3 CC 21 GA X 1 1/2 IN (100/BX 8BX/CA)

## (undated) DEVICE — LEAD PACING TEMP MYO - (12/BX)

## (undated) DEVICE — CLIP SM INTNL HRZN TI ESCP LGT - (24EA/PK 25PK/BX)

## (undated) DEVICE — DECANTER FLD BLS - (50/CA)

## (undated) DEVICE — DERMABOND ADVANCED - (12EA/BX)

## (undated) DEVICE — SOD. CHL. INJ. 0.9% 1000 ML - (14EA/CA 60CA/PF)

## (undated) DEVICE — GOWN SURGICAL XX-LARGE - (28EA/CA) SIRUS NON REINFORCED

## (undated) DEVICE — GLOVE BIOGEL PI ORTHO SZ 7 PF LF (40PR/BX)